# Patient Record
Sex: FEMALE | Race: WHITE | NOT HISPANIC OR LATINO | Employment: OTHER | URBAN - NONMETROPOLITAN AREA
[De-identification: names, ages, dates, MRNs, and addresses within clinical notes are randomized per-mention and may not be internally consistent; named-entity substitution may affect disease eponyms.]

---

## 2018-03-23 ENCOUNTER — OFFICE VISIT CONVERTED (OUTPATIENT)
Dept: FAMILY MEDICINE CLINIC | Age: 43
End: 2018-03-23
Attending: NURSE PRACTITIONER

## 2019-10-04 ENCOUNTER — OFFICE VISIT CONVERTED (OUTPATIENT)
Dept: FAMILY MEDICINE CLINIC | Age: 44
End: 2019-10-04
Attending: FAMILY MEDICINE

## 2019-10-04 ENCOUNTER — HOSPITAL ENCOUNTER (OUTPATIENT)
Dept: OTHER | Facility: HOSPITAL | Age: 44
Discharge: HOME | End: 2019-10-04
Attending: FAMILY MEDICINE

## 2020-01-21 ENCOUNTER — OFFICE VISIT CONVERTED (OUTPATIENT)
Dept: FAMILY MEDICINE CLINIC | Age: 45
End: 2020-01-21
Attending: FAMILY MEDICINE

## 2020-01-31 ENCOUNTER — OFFICE VISIT CONVERTED (OUTPATIENT)
Dept: FAMILY MEDICINE CLINIC | Age: 45
End: 2020-01-31
Attending: FAMILY MEDICINE

## 2020-02-13 ENCOUNTER — HOSPITAL ENCOUNTER (OUTPATIENT)
Dept: PHYSICAL THERAPY | Facility: CLINIC | Age: 45
Setting detail: RECURRING SERIES
Discharge: HOME | End: 2020-06-16
Attending: FAMILY MEDICINE

## 2020-03-02 ENCOUNTER — OFFICE VISIT CONVERTED (OUTPATIENT)
Dept: FAMILY MEDICINE CLINIC | Age: 45
End: 2020-03-02
Attending: FAMILY MEDICINE

## 2020-06-09 ENCOUNTER — HOSPITAL ENCOUNTER (OUTPATIENT)
Dept: OTHER | Facility: HOSPITAL | Age: 45
Discharge: HOME | End: 2020-06-09
Attending: NURSE PRACTITIONER

## 2020-06-09 ENCOUNTER — OFFICE VISIT CONVERTED (OUTPATIENT)
Dept: FAMILY MEDICINE CLINIC | Age: 45
End: 2020-06-09
Attending: NURSE PRACTITIONER

## 2020-06-11 LAB — SARS-COV-2 RNA SPEC QL NAA+PROBE: NOT DETECTED

## 2020-06-18 ENCOUNTER — HOSPITAL ENCOUNTER (OUTPATIENT)
Dept: PHYSICAL THERAPY | Facility: CLINIC | Age: 45
Setting detail: RECURRING SERIES
Discharge: HOME | End: 2020-09-14
Attending: FAMILY MEDICINE

## 2020-08-06 ENCOUNTER — HOSPITAL ENCOUNTER (OUTPATIENT)
Dept: OTHER | Facility: HOSPITAL | Age: 45
Discharge: HOME | End: 2020-08-06
Attending: NURSE PRACTITIONER

## 2020-08-06 ENCOUNTER — OFFICE VISIT CONVERTED (OUTPATIENT)
Dept: FAMILY MEDICINE CLINIC | Age: 45
End: 2020-08-06
Attending: NURSE PRACTITIONER

## 2020-08-14 ENCOUNTER — HOSPITAL ENCOUNTER (OUTPATIENT)
Dept: OTHER | Facility: HOSPITAL | Age: 45
Discharge: HOME | End: 2020-08-14
Attending: NURSE PRACTITIONER

## 2020-08-14 ENCOUNTER — OFFICE VISIT CONVERTED (OUTPATIENT)
Dept: FAMILY MEDICINE CLINIC | Age: 45
End: 2020-08-14
Attending: NURSE PRACTITIONER

## 2020-08-14 LAB
ALBUMIN SERPL-MCNC: 4.1 G/DL (ref 3.5–5)
ALBUMIN/GLOB SERPL: 1.7 {RATIO} (ref 1.4–2.6)
ALP SERPL-CCNC: 48 U/L (ref 42–98)
ALT SERPL-CCNC: 15 U/L (ref 10–40)
ANION GAP SERPL CALC-SCNC: 18 MMOL/L (ref 8–19)
AST SERPL-CCNC: 23 U/L (ref 15–50)
BASOPHILS # BLD MANUAL: 0.05 10*3/UL (ref 0–0.2)
BASOPHILS NFR BLD MANUAL: 0.7 % (ref 0–3)
BILIRUB SERPL-MCNC: 0.19 MG/DL (ref 0.2–1.3)
BUN SERPL-MCNC: 10 MG/DL (ref 5–25)
BUN/CREAT SERPL: 12 {RATIO} (ref 6–20)
CALCIUM SERPL-MCNC: 9.8 MG/DL (ref 8.7–10.4)
CHLORIDE SERPL-SCNC: 101 MMOL/L (ref 99–111)
CHOLEST SERPL-MCNC: 191 MG/DL (ref 107–200)
CHOLEST/HDLC SERPL: 2.8 {RATIO} (ref 3–6)
CONV CO2: 25 MMOL/L (ref 22–32)
CONV RHEUMATOID FACTOR IGM: <10 [IU]/ML (ref 0–14)
CONV TOTAL PROTEIN: 6.5 G/DL (ref 6.3–8.2)
CREAT UR-MCNC: 0.85 MG/DL (ref 0.5–0.9)
CRP SERPL-MCNC: 1.2 MG/L (ref 0–5)
DEPRECATED RDW RBC AUTO: 46.2 FL
EOSINOPHIL # BLD MANUAL: 0.19 10*3/UL (ref 0–0.7)
EOSINOPHIL NFR BLD MANUAL: 2.7 % (ref 0–7)
ERYTHROCYTE [DISTWIDTH] IN BLOOD BY AUTOMATED COUNT: 12.4 % (ref 11.5–14.5)
ERYTHROCYTE [SEDIMENTATION RATE] IN BLOOD: 2 MM/H (ref 0–20)
GFR SERPLBLD BASED ON 1.73 SQ M-ARVRAT: >60 ML/MIN/{1.73_M2}
GLOBULIN UR ELPH-MCNC: 2.4 G/DL (ref 2–3.5)
GLUCOSE SERPL-MCNC: 79 MG/DL (ref 65–99)
GRANS (ABSOLUTE): 3.71 10*3/UL (ref 2–8)
GRANS: 53.4 % (ref 30–85)
HBA1C MFR BLD: 14.2 G/DL (ref 12–16)
HCT VFR BLD AUTO: 42 % (ref 37–47)
HDLC SERPL-MCNC: 68 MG/DL (ref 40–60)
IMM GRANULOCYTES # BLD: 0.02 10*3/UL (ref 0–0.54)
IMM GRANULOCYTES NFR BLD: 0.3 % (ref 0–0.43)
LDLC SERPL CALC-MCNC: 111 MG/DL (ref 70–100)
LYMPHOCYTES # BLD MANUAL: 2.43 10*3/UL (ref 1–5)
LYMPHOCYTES NFR BLD MANUAL: 8 % (ref 3–10)
MCH RBC QN AUTO: 33.7 PG (ref 27–31)
MCHC RBC AUTO-ENTMCNC: 33.8 G/DL (ref 33–37)
MCV RBC AUTO: 99.8 FL (ref 81–99)
MONOCYTES # BLD AUTO: 0.56 10*3/UL (ref 0.2–1.2)
OSMOLALITY SERPL CALC.SUM OF ELEC: 286 MOSM/KG (ref 273–304)
PLATELET # BLD AUTO: 239 10*3/UL (ref 130–400)
PMV BLD AUTO: 8.9 FL (ref 7.4–10.4)
POTASSIUM SERPL-SCNC: 4.8 MMOL/L (ref 3.5–5.3)
RBC # BLD AUTO: 4.21 10*6/UL (ref 4.2–5.4)
SODIUM SERPL-SCNC: 139 MMOL/L (ref 135–147)
TRIGL SERPL-MCNC: 60 MG/DL (ref 40–150)
TSH SERPL-ACNC: 0.82 M[IU]/L (ref 0.27–4.2)
VARIANT LYMPHS NFR BLD MANUAL: 34.9 % (ref 20–45)
VLDLC SERPL-MCNC: 12 MG/DL (ref 5–37)
WBC # BLD AUTO: 6.96 10*3/UL (ref 4.8–10.8)

## 2020-08-15 LAB
DSDNA AB SER-ACNC: NEGATIVE [IU]/ML
ENA AB SER IA-ACNC: NEGATIVE {RATIO}

## 2020-08-20 ENCOUNTER — HOSPITAL ENCOUNTER (OUTPATIENT)
Dept: OTHER | Facility: HOSPITAL | Age: 45
Discharge: HOME | End: 2020-08-20
Attending: NURSE PRACTITIONER

## 2020-08-20 ENCOUNTER — OFFICE VISIT CONVERTED (OUTPATIENT)
Dept: FAMILY MEDICINE CLINIC | Age: 45
End: 2020-08-20
Attending: NURSE PRACTITIONER

## 2020-08-21 ENCOUNTER — OFFICE VISIT CONVERTED (OUTPATIENT)
Dept: NEUROSURGERY | Facility: CLINIC | Age: 45
End: 2020-08-21
Attending: PHYSICIAN ASSISTANT

## 2020-08-26 LAB
CONV LAST MENSTURAL PERIOD: 2014
HPV HYBRID CAPTURE HIGH RISK: NEGATIVE
SPECIMEN SOURCE: NORMAL
SPECIMEN SOURCE: NORMAL
THIN PREP CVX: NORMAL

## 2020-09-11 ENCOUNTER — HOSPITAL ENCOUNTER (OUTPATIENT)
Dept: OTHER | Facility: HOSPITAL | Age: 45
Discharge: HOME | End: 2020-09-11
Attending: PHYSICIAN ASSISTANT

## 2020-09-23 ENCOUNTER — HOSPITAL ENCOUNTER (OUTPATIENT)
Dept: OTHER | Facility: HOSPITAL | Age: 45
Discharge: HOME | End: 2020-09-23
Attending: NURSE PRACTITIONER

## 2020-10-02 ENCOUNTER — OFFICE VISIT CONVERTED (OUTPATIENT)
Dept: NEUROSURGERY | Facility: CLINIC | Age: 45
End: 2020-10-02
Attending: PHYSICIAN ASSISTANT

## 2020-10-02 ENCOUNTER — CONVERSION ENCOUNTER (OUTPATIENT)
Dept: NEUROLOGY | Facility: CLINIC | Age: 45
End: 2020-10-02

## 2020-12-31 ENCOUNTER — OFFICE VISIT CONVERTED (OUTPATIENT)
Dept: FAMILY MEDICINE CLINIC | Age: 45
End: 2020-12-31

## 2021-02-04 ENCOUNTER — CONVERSION ENCOUNTER (OUTPATIENT)
Dept: NEUROLOGY | Facility: CLINIC | Age: 46
End: 2021-02-04

## 2021-02-04 ENCOUNTER — OFFICE VISIT CONVERTED (OUTPATIENT)
Dept: NEUROLOGY | Facility: CLINIC | Age: 46
End: 2021-02-04
Attending: PSYCHIATRY & NEUROLOGY

## 2021-05-10 NOTE — H&P
"   History and Physical      Patient Name: Destiney Thomas   Patient ID: [id number]   Sex: Female   YOB: 1975    Primary Care Provider: Kalie ZARAGOZA   Referring Provider: Cori Wood PA-C    Visit Date: February 4, 2021    Provider: Wei Recinos MD   Location: [facility]   Location Address: [address] [place] [address]  [id number]   Location Phone: [phone]          Chief Complaint     L\">BUE numbness tingling pain and weakness R>L       History Of Present Illness  Destiney Thomas is a 45 year old /White female who presents today to [facility] today referred from Cori Wood PA-C.      45-year-old woman evaluated for right hand greater than left hand numbness.  This has been ongoing for the last 3 years.  She states that the right hand and arm gets numb and tingly about 4 times a day usually associated doing activities such as rolling her cigarettes.  She states she can move her arm or wiggle or shake her hand and usually the numbness and tingling goes away.  She also has neck pain but there are no radicular symptoms of pain going down her arms.  It is numbness and tingling that is bothering her.  She states that the entire hand gets numb and tingly and the tingling goes up to her shoulder.  She is here today for EMG nerve conduction study.    She has had an MRI of the cervical spine which I reviewed the report.    She states that she used the wrist splint for 6 months and it did not help her symptoms.       Past Medical History  Lumbago/low back pain         Past Surgical History  Breast augmentation         Medication List  cyclobenzaprine 5 mg oral tablet; Flomax 0.4 mg oral capsule; Zofran 4 mg oral tablet         Allergy List  NO KNOWN DRUG ALLERGIES       Allergies Reconciled  Family Medical History  Stroke         Social History  Tobacco (Current every day)         Review of Systems  · Constitutional  o Admits  o : " "excessive sweating  o Denies  o : chills, fatigue, fever, sycope/passing out, weight gain, weight loss  · Eyes  o Admits  o : changes in vision, blurry vision  o Denies  o : double vision  · HENT  o Admits  o : ringing in the ears  o Denies  o : loss of hearing, ear aches, sore throat, nasal congestion, sinus pain, nose bleeds, seasonal allergies  · Cardiovascular  o Denies  o : blood clots, swollen legs, anemia, easy burising or bleeding, transfusions  · Respiratory  o Denies  o : shortness of breath, dry cough, productive cough, pneumonia, COPD  · Gastrointestinal  o Denies  o : difficulty swallowing, reflux  · Genitourinary  o Denies  o : incontinence  · Neurologic  o Admits  o : headache, loss of balance, dizziness/vertigo, numbness/tingling/paresthesia   o Denies  o : seizure, stroke, tremor, falls, difficulty with sleep, difficulty with coordination, difficulty with dexterity, weakness  · Musculoskeletal  o Admits  o : neck stiffness/pain, muscle aches, spasms, pain radiating in arm, pain radiating in leg, low back pain  o Denies  o : swollen lymph nodes, joint pain, weakness, sciatica  · Endocrine  o Denies  o : diabetes, thyroid disorder  · Psychiatric  o Denies  o : anxiety, depression      Vitals  Date Time BP Position Site L\R Cuff Size HR RR TEMP (F) WT  HT  BMI kg/m2 BSA m2 O2 Sat FR L/min FiO2 HC       02/04/2021 10:12 /76 Sitting    80 - R 18  165lbs 0oz 5'  5.5\" 27.04 1.86             Physical Examination     She is alert, fluent, phasic, follows commands well.  There is no weakness of the upper extremity on average muscle testing.  There is no weakness of intrinsic hand muscles.  Reflexes are normoactive and symmetrical in the biceps, triceps, brachioradialis.  Phalen sign is negative.  Pressure at the wrist does not produce tingling to her fingers.  Range of motion of the right arm and shoulder is normal.           Assessment  · Carpal tunnel syndrome     354.0/G56.00  This study is abnormal " and shows electrophysiologic evidence for moderate carpal tunnel syndrome on the right and mild carpal tunnel syndrome on the left. EMG studies unremarkable and does not show electrophysiologic evidence for cervical radiculopathy involving the C5-T1 ventral nerve roots on the right side. There is no evidence of denervation or reinnervation in the muscles tested.    She is to follow-up with neurosurgery with the option to do carpal tunnel surgery to the right hand.    Total time spent with patient and coordinating patient care was 25 minutes.      Plan  · Orders  o Muscle test done with Nerve test Complete (33648) - 354.0/G56.00 - 02/04/2021  o Nerve conduction studies; 5-6 studies (75559) - 354.0/G56.00 - 02/04/2021  · Medications  o Medications have been Reconciled  o Transition of Care or Provider Policy  · Instructions  o Encouraged to follow-up with Primary Care Provider for preventative care.            Electronically Signed by: Wei Recinos MD -Author on February 4, 2021 11:16:00 AM

## 2021-05-10 NOTE — H&P
History and Physical      Patient Name: Destiney Thomas   Patient ID: [id number]   Sex: Female   YOB: 1975    Primary Care Provider: Kalie ZARAGOZA   Referring Provider: Kalie ZARAGOZA    Visit Date: August 21, 2020    Provider: Cori Wood PA-C   Location: [facility]   Location Address: [address] [place] [address] Rian Mendoza [place], [place]  [id number]   Location Phone: [phone]          Chief Complaint     Patient is here with complaints of low back pain. Xray at [facility]       History Of Present Illness  The patient is a 45 year old /White female, who presents on referral from Kalie ZARAGOZA, for a neurosurgical evaluation of low back pain.   The pain developed gradually around 23 years but getting worse. Pain into the right leg down to the foot that is intermittent. It is moderate (3-6/10) in severity, has an aching quality and radiates into the right S1 distribution. The pain has been constant. The pain tends to be maximal at no specific time, but waxes and wanes in severity throughout the day. The patient states the pain is aggravated by prolonged sitting, prolonged standing, walking long distances, and staying in one position for extended periods. No alleviating factors are reported.   She denies bowel or bladder dysfunction. The patient has no prior history of neck or back surgery.   RECENT INTERVENTIONS:  She has been previously treated with physical therapy, NSAIDs, and TENS unit and massagers. The physical therapy was minimally effective in relieving the pain.   INFORMATION REVIEWED:  The following information was reviewed: radiology reports and images. Lumbar radiographs revealed ddd most notable at L5/S1.Cervical spine radigraphs showed ddd at C5/6 and C6/7 with osteophytes..      Neck pain for several years and getting worse.  Having hedaches that are intermittent.  Worse with working because of repetitive motions of the arms.  She has been on  different job at work so pain has eased a little.  Paresthesias in the from the elbow down to the middle, ring, and small fingers but sometimes in proximal arms as well.  Symptoms wake her up at night.  Using TENS unit, massagers, and physical therapy with minimal improvement. Cervical traction helps pain.       Past Medical History  Lumbago/low back pain         Past Surgical History  Breast augmentation         Medication List  Flomax 0.4 mg oral capsule         Allergy List  NO KNOWN DRUG ALLERGIES       Allergies Reconciled  Family Medical History  Stroke         Social History  Tobacco (Current every day)         Review of Systems  · Constitutional  o Denies  o : chills, excessive sweating, fatigue, fever, sycope/passing out, weight gain, weight loss  · Eyes  o Admits  o : changes in vision, blurry vision, double vision  · HENT  o Denies  o : loss of hearing, ringing in the ears, ear aches, sore throat, nasal congestion, sinus pain, nose bleeds, seasonal allergies  · Cardiovascular  o Admits  o : swollen legs, easy burising or bleeding  o Denies  o : blood clots, anemia, transfusions  · Respiratory  o Denies  o : shortness of breath, dry cough, productive cough, pneumonia, COPD  · Gastrointestinal  o Denies  o : difficulty swallowing, reflux  · Genitourinary  o Denies  o : incontinence  · Neurologic  o Admits  o : headache, difficulty with coordination, weakness  o Denies  o : seizure, stroke, tremor, loss of balance, falls, dizziness/vertigo, difficulty with sleep, numbness/tingling/paresthesia , difficulty with dexterity  · Musculoskeletal  o Admits  o : neck stiffness/pain, muscle aches, joint pain, sciatica, pain radiating in arm, pain radiating in leg, low back pain  o Denies  o : swollen lymph nodes, weakness, spasms  · Endocrine  o Denies  o : diabetes, thyroid disorder  · Psychiatric  o Denies  o : anxiety, depression  · All Others Negative      Vitals  Date Time BP Position Site L\R Cuff Size HR RR  "TEMP (F) WT  HT  BMI kg/m2 BSA m2 O2 Sat        08/21/2020 11:30 AM        97.8 165lbs 0oz 5'  6\" 26.63 1.87           Physical Examination  · Constitutional  o Appearance  o : well-nourished, well developed, alert, in no acute distress  · Respiratory  o Respiratory Effort  o : breathing unlabored  · Cardiovascular  o Peripheral Vascular System  o :   § Extremities  § : no edema or cyanosis  · Musculoskeletal  o Spine  o :   § Range of Motion  § : range of motion normal  o Right Lower Extremity  o :   § Inspection/Palpation  § : no joint or limb tenderness to palpation, no edema present, no ecchymosis  § Joint Stability  § : joint stability within normal limits  § Range of Motion  § : range of motion normal, no joint crepitations present, no pain on motion, Jaylen's test negative  o Left Lower Extremity  o :   § Inspection/Palpation  § : no joint or limb tenderness to palpation, no edema present, no ecchymosis  § Joint Stability  § : joint stability within normal limits  § Range of Motion  § : range of motion normal, no joint crepitations present, no pain on motion, Jaylen's test negative  · Skin and Subcutaneous Tissue  o Extremities  o :   § Right Lower Extremity  § : no lesions or areas of discoloration  § Left Lower Extremity  § : no lesions or areas of discoloration  o Back  o : no lesions or areas of discoloration  · Neurologic  o Mental Status Examination  o :   § Orientation  § : alert and oriented to time, person, place and events  o Cranial Nerves  o :   § Oculomotor, Trochlear and Abducens Nerves  § : EOM intact  § Facial Nerve  § : Face symmetric  o Motor Examination  o :   § RUE Strength  § : strength normal  § RUE Motor Function  § : tone normal  § LUE Strength  § : strength normal  § LUE Motor Function  § : tone normal  § RLE Strength  § : strength normal  § RLE Motor Function  § : tone normal, no atrophy, no abnormal movements noted  § LLE Strength  § : strength normal  § LLE Motor Function  § : " tone normal, no atrophy, no abnormal movements noted  o Reflexes  o :   § RUE  § : biceps reflex 3, triceps reflex 3, brachioradialis reflex 3   § LUE  § : biceps reflex 3, triceps reflex 3, brachioradialis reflex 3   § RLE  § : knee and ankle reflexes 0/4, SLR negative  § LLE  § : knee and ankle reflexes 3/4, SLR negative  o Sensation  o :   § Light Touch  § : sensation intact to light touch in extremities  § Pin Prick  § : sensation intact to pin prick in extremities  o Gait and Station  o :   § Gait Screening  § : normal gait, able to stand without difficulty  · Psychiatric  o Mood and Affect  o : mood normal, affect appropriate     positive Tinel's at the wrists and negative at the elbows           Assessment  · Degeneration of lumbar intervertebral disc     722.52/M51.36  · Lumbago/low back pain     724.3/M54.40  · Paresthesia     782.0/R20.2  · Radiculopathy, lumbosacral     724.4/M54.16  · Cervicalgia     723.1/M54.2  · Cervical disc disorder     722.91/M50.90    Problems Reconciled  Plan  · Orders  o MRI of lumbar spine without contrast (76831) - 722.52/M51.36, 724.3/M54.40, 724.4/M54.16 - 08/21/2020   [facility]  o MRI cervical spine w/o contrast (48406) - 723.1/M54.2, 722.91/M50.90, 782.0/R20.2 - 08/21/2020   [facility]   · Medications  o Medications have been Reconciled  o Transition of Care or Provider Policy  · Instructions  o Encouraged to follow-up with Primary Care Provider for preventative care.  o The ROS and the PFSH were reviewed at today's visit.  o Call or return to office if symptoms worsen or persist.  o She has ddd at L5/S1 and C5/6 and C6/7 on plain radiographs. Failed PT and NSAIDS. I will order MRIs and f/u to discuss imaging.             Electronically Signed by: Cori Wood PA-C -Author on August 21, 2020 12:00:50 PM

## 2021-05-13 NOTE — PROGRESS NOTES
Progress Note      Patient Name: Destiney Thomas   Patient ID: [id number]   Sex: Female   YOB: 1975    Primary Care Provider: Kalie ZARAGOZA   Referring Provider: Kalie ZARAGOZA    Visit Date: October 2, 2020    Provider: Cori Wood PA-C   [facility]   Location Address: [address] [place] [address] [place], [place]  [id number]   Location Phone: [phone]          Chief Complaint  · Follow-up      History Of Present Illness  The patient is a 45 year old /White female who is in the office for followup appointment.      Here for f/u to discuss MRI results.  MRI cervical spine showed moderate central canal stenosis at C5/6 and C6/7.  MRI lumbar spine showed ddd at several levels in the lower lumbar spine.  She continues to have paresthesias in the arms/hands and symptoms wake her up at night.  Has not tried wrist splints at this point.       Past Medical History  Lumbago/low back pain         Past Surgical History  Breast augmentation         Medication List  cyclobenzaprine 5 mg oral tablet; Flomax 0.4 mg oral capsule; Zofran 4 mg oral tablet         Allergy List  NO KNOWN DRUG ALLERGIES       Allergies Reconciled  Family Medical History  Stroke         Social History  Tobacco (Current every day)         Review of Systems  · Constitutional  o Admits  o : excessive sweating, fatigue  o Denies  o : chills, fever, sycope/passing out, weight gain, weight loss  · Eyes  o Admits  o : changes in vision, blurry vision, double vision  · HENT  o Admits  o : ringing in the ears, nasal congestion, sinus pain, seasonal allergies  o Denies  o : loss of hearing, ear aches, sore throat, nose bleeds  · Cardiovascular  o Admits  o : easy burising or bleeding  o Denies  o : blood clots, swollen legs, anemia, transfusions  · Respiratory  o Admits  o : productive cough  o Denies  o : shortness of breath, dry cough, pneumonia,  "COPD  · Gastrointestinal  o Denies  o : difficulty swallowing, reflux  · Genitourinary  o Denies  o : incontinence  · Neurologic  o Admits  o : headache, loss of balance, falls, dizziness/vertigo, difficulty with sleep, numbness/tingling/paresthesia , difficulty with coordination, weakness  o Denies  o : seizure, stroke, tremor, difficulty with dexterity  · Musculoskeletal  o Admits  o : neck stiffness/pain, swollen lymph nodes, muscle aches, weakness, spasms, pain radiating in arm, pain radiating in leg, low back pain  o Denies  o : joint pain, sciatica  · Endocrine  o Denies  o : diabetes, thyroid disorder  · Psychiatric  o Denies  o : anxiety, depression  · All Others Negative      Vitals  Date Time BP Position Site L\R Cuff Size HR RR TEMP (F) WT  HT  BMI kg/m2 BSA m2 O2 Sat FR L/min FiO2        10/02/2020 10:00 AM        98.2 167lbs 2oz 5'  6\" 26.97 1.88             Physical Examination  · Constitutional  o Appearance  o : well-nourished, well developed, alert, in no acute distress  · Respiratory  o Respiratory Effort  o : breathing unlabored  · Cardiovascular  o Peripheral Vascular System  o :   § Extremities  § : no cyanosis, clubbing or edema  · Neurologic  o Mental Status Examination  o :   § Orientation  § : grossly oriented to person, place and time  o Motor Examination  o :   § RUE Strength  § : strength normal  § RUE Motor Function  § : tone normal  § LUE Strength  § : strength normal  § LUE Motor Function  § : tone normal  § RLE Strength  § : strength normal  § RLE Motor Function  § : tone normal, no atrophy, no abnormal movements noted  § LLE Strength  § : strength normal  § LLE Motor Function  § : tone normal, no atrophy, no abnormal movements noted  o Reflexes  o :   § RUE  § : biceps reflex 2, triceps reflex 2, brachioradialis reflex 2  § LUE  § : biceps reflex 2, triceps reflex 2, brachioradialis reflex 2  § RLE  § : 2/4 knee and ankle reflex  § LLE  § : 2/4 knee and ankle " reflex  o Sensation  o :   § Light Touch  § : sensation intact to light touch in extremities  o Gait and Station  o :   § Gait Screening  § : gait within normal limits  · Psychiatric  o Mood and Affect  o : mood normal, affect appropriate     positive Phalen's bilaterally, positive Tinel's at the left elbow           Assessment  · Degeneration of lumbar intervertebral disc     722.52/M51.36  · Lumbago/low back pain     724.3/M54.40  · Paresthesia     782.0/R20.2  · Radiculopathy, lumbosacral     724.4/M54.16  · Cervicalgia     723.1/M54.2  · Cervical disc disorder     722.91/M50.90  · Cervical stenosis of spinal canal     723.0/M48.02    Problems Reconciled  Plan  · Orders  o EMG/NCV of Upper Extremities Bilaterally (15730) - 723.1/M54.2, 722.91/M50.90, 782.0/R20.2 - 10/02/2020   concern for carpal tunnel and ulnar neuropathy  · Medications  o Medications have been Reconciled  o Transition of Care or Provider Policy  · Instructions  o The ROS and the PFSH were reviewed at today's visit.  o Call or return to office if symptoms worsen or persist.   o I will order an EMG/NCV of the upper extremities to evaluate for carpal tunnel and ulnar neuropathy. She will obtain wrist splints. F/U in [place] office to discuss results. Moderate central canal stenosis from central disc protrusions at C5/6 and C6/7 and multilevel ddd in the lumbar region. Surgery not recommend for the cervical or lumbar spine at this point.             Electronically Signed by: Cori Wood PA-C -Author on October 2, 2020 10:30:05 AM

## 2021-05-14 VITALS
SYSTOLIC BLOOD PRESSURE: 121 MMHG | WEIGHT: 165 LBS | HEIGHT: 65 IN | BODY MASS INDEX: 27.49 KG/M2 | DIASTOLIC BLOOD PRESSURE: 76 MMHG | HEART RATE: 80 BPM | RESPIRATION RATE: 18 BRPM

## 2021-05-14 VITALS — TEMPERATURE: 98.2 F | HEIGHT: 66 IN | BODY MASS INDEX: 26.86 KG/M2 | WEIGHT: 167.12 LBS

## 2021-05-15 VITALS — WEIGHT: 165 LBS | TEMPERATURE: 97.8 F | HEIGHT: 66 IN | BODY MASS INDEX: 26.52 KG/M2

## 2021-05-18 NOTE — PROGRESS NOTES
Destiney Thomas  1975     Office/Outpatient Visit    Visit Date: Thu, Dec 31, 2020 01:28 pm    Provider: Riya Kelsey N.P. (Assistant: Monique Wood, )    Location: [facility]        Electronically signed by Riya Kelsey N.P. on  12/31/2020 01:54:36 PM                             Subjective:        CC: Ms. Thomas is a 45 year old White female.  discuss flexeril, sinus issues due to not having nasal spray;         HPI: Patient reports sinus congestion due to not being able to  her flonase. Patient is taking flexeril for her neck and back pain. Patient is seeing neurologist and nerve specialist due to nerve and muscle spasms. Follow up with neuro in Feb, sees the nerve specialist in early Feb. Patient reports 3 migraines this month. Flexeril was last taken beginning of December and is out of refills. Denies N, V, D, fever, loss of taste or smell.     ROS:     CONSTITUTIONAL:  Negative for chills, fatigue and fever.      EYES:  Negative for blurred vision.      E/N/T:  Negative for ear pain, nasal congestion, frequent rhinorrhea, hoarseness, sinus pressure and sore throat.      CARDIOVASCULAR:  Negative for chest pain and pedal edema.      RESPIRATORY:  Negative for recent cough and dyspnea.      GASTROINTESTINAL:  Negative for abdominal pain, constipation, diarrhea, nausea and vomiting.      GENITOURINARY:  Negative for dysuria and frequent urination.      MUSCULOSKELETAL:  Negative for myalgias.      NEUROLOGICAL:  Negative for headaches.      PSYCHIATRIC:  Negative for anxiety, depression, and sleep disturbances.          Past Medical History / Family History / Social History:         Last Reviewed on 12/31/2020 01:42 PM by Riya Kelsey    Past Medical History:                 PAST MEDICAL HISTORY         Asthma: moderate severity; had inhaler , and treatment began  13 years ago, last episode in 2009;     Urinary Tract Infections, Recurrent: has had e coli infections;      Seizure Disorder: did test for seizures , negative, and episode was related to low blood sugar;  syncope dx'd in  did have a headace that lasted at least 24 hours         GYNECOLOGICAL HISTORY:        Problems with menstrual cycles include heavy bleeding.      Contraception: S/P tubal ligation;    Menarche occurred at age 10.    No abnormal Paps    Has never had a mammogram.  Hospitalizations: Never             ADVANCED DIRECTIVES: None         PREVENTIVE HEALTH MAINTENANCE             PAP SMEAR: was last done 14 with normal results         Surgical History:         Positive for    Breast Augmentation - below the muscle; at age [age].  ;     Positive for    Bilateral Tubal Ligation and    uterine ablation;;         Family History:     Father: Hypertension; Hyperlipidemia; Myocardial Infarction;  Transient Ischemic Attack; blocked carotids;  Anxiety     Mother: Hypertension; Hyperlipidemia;  lupus;  Anxiety; Depression; Bipolar Disorder     Brother(s): Healthy; 2 brother(s) total     Paternal Grandfather: Medical history unknown     Paternal Grandmother: Medical history unknown     Maternal Grandfather: Medical history unknown     Maternal Grandmother: Alzheimer's Disease         Social History:     Occupation: [employer]      Marital Status:      Children: 2 children         Tobacco/Alcohol/Supplements:     Last Reviewed on 2020 01:42 PM by Riya Kelsey    Tobacco: Current Smoker: She currently smokes every day, 1-1/2 packs per day.          Alcohol: Frequency: Socially         Substance Abuse History:     Last Reviewed on 2020 01:42 PM by Riya Kelsey        Marijuana: Current use.          Mental Health History:     Last Reviewed on 2020 01:42 PM by Riya Kelsey        Generalized Anxiety Disorder         Communicable Diseases (eg STDs):     Last Reviewed on 3/02/2020 06:52 PM by Raoul Galeas        Immunizations:     None        Allergies:      Last Reviewed on 12/31/2020 01:42 PM by Riya Kelsey    No Known Allergies.        Current Medications:     Last Reviewed on 12/31/2020 01:42 PM by Riya Kelsey    cyclobenzaprine 10 mg oral tablet [TAKE 1 TABLET BY MOUTH EVERY 8 HOURS AS NEEDED FOR MUSCLE SPASM]    ondansetron HCL 4 mg oral tablet [TAKE 1 TABLET BY MOUTH EVERY 4-6 HOURS AS NEEDED FOR NAUSEA AND VOMITING]    fluticasone propionate 50 mcg/actuation Intranasal Spray, Suspension [inhale 1 spray (50 mcg) in each nostril by intranasal route daily]    efinaconazole 10 % Topical Solution With Applicator [apply to affected toenail(s) by topical route once daily x 48 weeks]        Objective:        Vitals:         Current: 12/31/2020 1:30:59 PM    Ht:  5 ft, 6 in;  Wt: 161.2 lbs;  BMI: 26.0T: 97.7 F (temporal);  BP: 124/73 mm Hg (right arm, sitting);  P: 77 bpm (right arm (BP Cuff), sitting);  sCr: 0.85 mg/dL;  GFR: 85.33        Exams:     PHYSICAL EXAM:     GENERAL:  well developed and nourished; appropriately groomed; in no apparent distress;     EYES: PERRL, EOMI     E/N/T: NOSE: nasal mucosa is erythematous;     NECK: range of motion is decreased with flexion, extension, side flexion, and due to pain;     RESPIRATORY: normal respiratory rate and pattern with no distress; normal breath sounds with no rales, rhonchi, wheezes or rubs;     CARDIOVASCULAR: normal rate; rhythm is regular;  no systolic murmur; no edema;     GASTROINTESTINAL: nontender; normal bowel sounds; no organomegaly;     LYMPHATIC: no enlargement of cervical or facial nodes; no supraclavicular nodes;     BREAST/INTEGUMENT: no rashs or lesions noted;     MUSCULOSKELETAL:  gait normal;     NEUROLOGIC: mental status: alert and oriented x 3; GROSSLY INTACT     PSYCHIATRIC:  appropriate affect and demeanor; normal speech pattern; grossly normal memory;         Assessment:         G43.909   Migraine, unspecified, not intractable, without status migrainosus       M54.2   Cervicalgia        M50.90   Cervical disc disorder, unspecified, unspecified cervical region       J30.2   Other seasonal allergic rhinitis           ORDERS:         Meds Prescribed:       [Refilled] cyclobenzaprine 10 mg oral tablet [TAKE 1 TABLET BY MOUTH EVERY 8 HOURS AS NEEDED FOR MUSCLE SPASM], #30 (thirty) tablets, Refills: 1 (one)       [Refilled] fluticasone propionate 50 mcg/actuation Intranasal Spray, Suspension [inhale 1 spray (50 mcg) in each nostril by intranasal route daily], #16 (sixteen) milliliters, Refills: 5 (five)                 Plan:         Migraine, unspecified, not intractable, without status migrainosusPatient has follow up with neuro in Feb.         CervicalgiaRefill placed on flexeril, patient sees nerve specialist and neuro in feb.           Prescriptions:       [Refilled] cyclobenzaprine 10 mg oral tablet [TAKE 1 TABLET BY MOUTH EVERY 8 HOURS AS NEEDED FOR MUSCLE SPASM], #30 (thirty) tablets, Refills: 1 (one)         Other seasonal allergic rhinitisrefill placed on flonase, patient to  today.           Prescriptions:       [Refilled] fluticasone propionate 50 mcg/actuation Intranasal Spray, Suspension [inhale 1 spray (50 mcg) in each nostril by intranasal route daily], #16 (sixteen) milliliters, Refills: 5 (five)             Charge Capture:         Primary Diagnosis:     G43.909  Migraine, unspecified, not intractable, without status migrainosus           Orders:      [id number]  Office/outpatient visit; established patient, level 3  (In-House)              M54.2  Cervicalgia     M50.90  Cervical disc disorder, unspecified, unspecified cervical region     J30.2  Other seasonal allergic rhinitis         ADDENDUMS:      ____________________________________    Addendum: 01/05/2021 12:57 PM - Riya Kelsey        Remove [id number]    Add [id number]

## 2021-05-18 NOTE — PROGRESS NOTES
Destiney Thomas  1975     Office/Outpatient Visit    Visit Date: Thu, Aug 20, 2020 09:45 am    Provider: Kalie Choudhary N.P. (Assistant: Cleopatra Gutierrez MA)    Location: [facility]        Electronically signed by Kalie Choudhary N.P. on  08/20/2020 10:37:39 AM                             Subjective:        CC: Ms. Thomas is a 45 year old White female.  Patient presents today for well woman exam (Not taking Claritin);         HPI:           Dx with encounter for general adult medical examination without abnormal findings; she cannot recall when she last had a physical exam.  Her last menstrual period was 2014.  She is not currently using any form of contraception.  She performs breast self-exams rarely.   Her last Pap smear was in 04/08/2016 and was normal.   Preventative Health updated today.  Ms. Thomas denies any history of abnormal Pap smears.  Tobacco: Current Smoker: She currently smokes every day, 1-1/2 packs per day.      ROS:     CONSTITUTIONAL:  Negative for fatigue, fever and night sweats.      CARDIOVASCULAR:  Negative for chest pain, palpitations and pedal edema.      RESPIRATORY:  Negative for recent cough and dyspnea.      GASTROINTESTINAL:  Negative for abdominal pain, constipation, diarrhea, nausea and vomiting.      GENITOURINARY:  Negative for dysuria, post-coital vaginal bleeding, urinary incontinence, vaginal discharge and vaginal itching.      MUSCULOSKELETAL:  Negative for myalgias.      INTEGUMENTARY/BREAST:  Negative for atypical mole(s) and rash.      NEUROLOGICAL:  Negative for dizziness, paresthesias and weakness.      PSYCHIATRIC:  Negative for anxiety, depression, sleep disturbance and suicidal thoughts.          Past Medical History / Family History / Social History:         Last Reviewed on 8/20/2020 10:08 AM by Kalie Choudhary    Past Medical History:                 PAST MEDICAL HISTORY         Asthma: moderate severity; had inhaler , and treatment  began  13 years ago, last episode in ;     Urinary Tract Infections, Recurrent: has had e coli infections;     Seizure Disorder: did test for seizures , negative, and episode was related to low blood sugar;  syncope dx'd in  did have a headace that lasted at least 24 hours         GYNECOLOGICAL HISTORY:        Problems with menstrual cycles include heavy bleeding.      Contraception: S/P tubal ligation;    Menarche occurred at age 10.    No abnormal Paps    Has never had a mammogram.  Hospitalizations: Never             ADVANCED DIRECTIVES: None         PREVENTIVE HEALTH MAINTENANCE             PAP SMEAR: was last done 14 with normal results         Surgical History:         Positive for    Breast Augmentation - below the muscle; at age [age].  ;     Positive for    Bilateral Tubal Ligation and    uterine ablation;;         Family History:     Father: Hypertension; Hyperlipidemia; Myocardial Infarction;  Transient Ischemic Attack; blocked carotids;  Anxiety     Mother: Hypertension; Hyperlipidemia;  lupus;  Anxiety; Depression; Bipolar Disorder     Brother(s): Healthy; 2 brother(s) total     Paternal Grandfather: Medical history unknown     Paternal Grandmother: Medical history unknown     Maternal Grandfather: Medical history unknown     Maternal Grandmother: Alzheimer's Disease         Social History:     Occupation: [employer]      Marital Status:      Children: 2 children         Tobacco/Alcohol/Supplements:     Last Reviewed on 2020 10:08 AM by Kalie Choudhary    Tobacco: Current Smoker: She currently smokes every day, 1-1/2 packs per day.          Alcohol: Frequency: Socially         Substance Abuse History:     Last Reviewed on 2020 10:08 AM by Kalie Choudhary        Marijuana: Current use.          Mental Health History:     Last Reviewed on 2020 10:08 AM by Kalie Choudhary        Generalized Anxiety Disorder         Communicable Diseases (eg  STDs):     Last Reviewed on 3/02/2020 06:52 PM by Raoul Galeas        Immunizations:     None        Allergies:     Last Reviewed on 8/20/2020 10:08 AM by Kalie Choudhary    No Known Allergies.        Current Medications:     Last Reviewed on 8/20/2020 10:08 AM by Kalie Choudhary    Claritin Allergy 24 hr     cyclobenzaprine 10 mg oral tablet [take 1 tablet by mouth every 8 hours as needed for muscle spasm]    Zofran 4 mg oral tablet [take 1 tablet q 4-6 hrs prn nausea or vomiting]    fluticasone propionate 50 mcg/actuation Intranasal Spray, Suspension [inhale 1 spray (50 mcg) in each nostril by intranasal route daily]    efinaconazole 10 % Topical Solution With Applicator [apply to affected toenail(s) by topical route once daily x 48 weeks]        Objective:        Vitals:         Current: 8/20/2020 9:49:27 AM    Ht:  5 ft, 6 in;  Wt: 162.4 lbs;  BMI: 26.2T: 97.6 F (temporal);  BP: 112/64 mm Hg (right arm, sitting);  P: 86 bpm (right arm (BP Cuff), sitting);  sCr: 0.85 mg/dL;  GFR: 85.60        Exams:     PHYSICAL EXAM:     GENERAL: vital signs recorded - well developed, well nourished;  no apparent distress;     EYES: extraocular movements intact; conjunctiva and cornea are normal; PERRLA;     RESPIRATORY: normal respiratory rate and pattern with no distress; normal breath sounds with no rales, rhonchi, wheezes or rubs;     CARDIOVASCULAR: normal rate; rhythm is regular;  no systolic murmur; no edema;     GASTROINTESTINAL: nontender; normal bowel sounds; no organomegaly;     GENITOURINARY:  normal appearance of external genitalia, urethra, and cervix; no cervical motion tenderness; no adnexal tenderness or masses on bimanual exam;     LYMPHATIC: no enlargement of cervical or facial nodes; no axillary adenopathy;     BREAST/INTEGUMENT: BREASTS: breast exam is normal without masses, skin changes, or nipple discharge; SKIN: no significant rashes or lesions; no suspicious moles; implants    MUSCULOSKELETAL:   Normal range of motion, strength and tone;     NEUROLOGIC: mental status: alert and oriented x 3;     PSYCHIATRIC:  appropriate affect and demeanor; normal speech pattern; grossly normal memory;         Assessment:         Z00.00   Encounter for general adult medical examination without abnormal findings       V72.31   Well Woman Exam           ORDERS:         Lab Orders:       [id number]  Cytopathology, slides, cervical or vaginal; manual screening under MD supervision  (Send-Out)              Other Orders:         Depression screen negative  (In-House)                      Plan:         Encounter for general adult medical examination without abnormal findingswill notify pt of results. Reviewed labs ordered from last visit. Pt to f/u in 1 year or sooner if needed. Pt v/u and had no further questions upon d/c.         Well Woman Examwill notify pt of results. Reviewed labs ordered from last visit. Pt to f/u in 1 year or sooner if needed. Pt v/u and had no further questions upon d/c.     MIPS PHQ-9 Depression Screening: Completed form scanned and in chart; Total Score 4; Negative Depression Screen           Orders:       [id number]  Cytopathology, slides, cervical or vaginal; manual screening under MD supervision  (Send-Out)              Depression screen negative  (In-House)                  Charge Capture:         Primary Diagnosis:     Z00.00  Encounter for general adult medical examination without abnormal findings           Orders:      [id number]  Preventive medicine, established patient, age 40-64 years  (In-House)              V72.31  Well Woman Exam           Orders:        Depression screen negative  (In-House)                  ADDENDUMS:      ____________________________________    Addendum: 08/20/2020 11:10 AM - Gloria Kim        Please add [phone] handling fee.        Addendum: 08/31/2020 12:35 PM - Kalie Choudhary        Primary Diagnosis:     Remove: Z00.00  Encounter for general adult medical  examination without     abnormal findings     Add: Z01.419 Encounter for gynecological exam (general) (routine) without     abnormal findings

## 2021-05-18 NOTE — PROGRESS NOTES
Destiney Thomas  1975     Office/Outpatient Visit    Visit Date: Fri, Jan 31, 2020 02:28 pm    Provider: Praneeth Aguilera MD (Assistant: Catherine Landon MA)    Location: [facility]        Electronically signed by Praneeth Aguilera MD on  02/06/2020 02:17:30 PM                             Subjective:        CC: Ms. Thomas is a 44 year old White female.  She presents with sinus pressure, neck pain, migranes. Not improved after ABX.          HPI:       Patient was last seen in our office approximately 10 days ago with complaints of chest congestion, chills, cough, dizziness, headache, nasal congestion, rhinorrhea and vomiting.  She was diagnosed with acute sinusitis and started on Augmentin for 7-day course.  She says she is completed her course of Augmentin and that she still has residual cough and congestion.  She notes that her symptoms are improved from prior but obviously have not went all the way away.      She also complains of neck tightness and pain today.  She says her headaches that she complained about at last visit improved with a headache cocktail but have intermittently returned since then.  She says she has had issues with her neck in the past that were helped by physical therapy and is wondering if she can have a referral for this. No weakness or numbness/tingling.  No blurry vision.    ROS:     CONSTITUTIONAL:  Negative for chills, fatigue and fever.      E/N/T:  Positive for nasal congestion and frequent rhinorrhea.   Negative for ear pain, tinnitus, hoarseness, sinus pressure or sore throat.      CARDIOVASCULAR:  Negative for chest pain, dizziness, palpitations and edema.      RESPIRATORY:  Positive for cough and chest congestion.   Negative for dyspnea.      GASTROINTESTINAL:  Negative for abdominal pain, diarrhea, nausea and vomiting.      MUSCULOSKELETAL:  Positive for neck pain.      INTEGUMENTARY/BREAST:  Negative for rash, breast mass and skin changes of breast.       NEUROLOGICAL:  Positive for headaches.   Negative for weakness.          Past Medical History / Family History / Social History:         Last Reviewed on 2020 02:17 PM by Praneeth Aguilera    Past Medical History:         Asthma: moderate severity; had inhaler , and treatment began  13 years ago, last episode in ;     Urinary Tract Infections, Recurrent: has had e coli infections;     Seizure Disorder: did test for seizures , negative, and episode was related to low blood sugar;  syncope dx'd in  did have a headace that lasted at least 24 hours         GYNECOLOGICAL HISTORY:        Problems with menstrual cycles include heavy bleeding.      Contraception: S/P tubal ligation;    Menarche occurred at age 10.    No abnormal Paps    Has never had a mammogram.  Hospitalizations: Never             ADVANCED DIRECTIVES: None         PREVENTIVE HEALTH MAINTENANCE             PAP SMEAR: was last done 14 with normal results         Surgical History:         Positive for    Breast Augmentation - below the muscle; at age [age].  ;     Positive for    Bilateral Tubal Ligation;         Family History:     Father: Hypertension;  Transient Ischemic Attack; blocked carotids     Mother: Hypertension; Hyperlipidemia     Brother(s): Healthy; 2 brother(s) total         Social History:     Occupation: [employer]      Marital Status:      Children: 2 children         Tobacco/Alcohol/Supplements:     Last Reviewed on 2020 02:17 PM by Praneeth Aguilera    Tobacco: Current Smoker: She currently smokes every day, 1-1/2 packs per day.          Alcohol: Frequency: Socially         Substance Abuse History:     Last Reviewed on 2020 02:17 PM by Praneeth Aguilera    NEGATIVE         Mental Health History:     Last Reviewed on 2020 02:17 PM by Praneeth Aguilera        Communicable Diseases (eg STDs):     Last Reviewed on 2020 02:17 PM by Praneeth Aguilera        Current Problems:     Last Reviewed on  "2/06/2020 02:17 PM by Praneeth Aguilera    Rosal[place]bert    Cigarette smoking    Gross hematuria     Acne rosacea    Rosacea, unspecified    Urinary frequency    Low back pain    Low back pain    High risk heterosexual behavior    High risk sexual behavior    Encounter for screening for depression    Cervicalgia    Migraine, unspecified, not intractable, without status migrainosus    Acute sinusitis, unspecified        Immunizations:     None        Allergies:     Last Reviewed on 2/06/2020 02:17 PM by Praneeth Aguilera    No Known Allergies.        Current Medications:     Last Reviewed on 2/06/2020 02:17 PM by Praneeth Aguilear    Claritin Allergy 24 hr         Objective:        Vitals:         Current: 1/31/2020 2:34:18 PM    Ht:  5 ft, 6 in;  Wt: 164 lbs;  BMI: 26.5T: 97.6 F (oral);  BP: 107/53 mm Hg (left arm, sitting);  P: 73 bpm (left arm (BP Cuff), sitting)        Exams:     PHYSICAL EXAM:     GENERAL: vital signs recorded - well developed, well nourished;  no apparent distress;     EYES: conjunctiva and cornea are normal;     E/N/T: EARS: both TMs are have fluid behind them;  NOSE: nasal mucosa is erythematous;  no sinus tenderness; OROPHARYNX: posterior pharynx shows no exudate and erythema;     NECK: trachea is midline; thyroid is non-palpable;     RESPIRATORY: Clear to auscultation bilaterally; no rales (\"crackles\") present; no rhonchi; no wheezes;     CARDIOVASCULAR: normal rate; rhythm is regular;  No murmurs. clicks, gallops or rubs appreciated; no edema;     LYMPHATIC: no enlargement of cervical or facial nodes; no supraclavicular nodes;     BREAST/INTEGUMENT: No significant rashes, lesions or suspicious moles within limits of examination;     MUSCULOSKELETAL: Tenderness to palpation of cervical and periscapular muscles with associated muscle spasm noted;     NEUROLOGIC: Grossly intact; mental status: alert and oriented x 3;     PSYCHIATRIC: appropriate affect and demeanor; normal speech pattern; Normal behavior;     "     Assessment:         J01.90   Acute sinusitis, unspecified       M54.2   Cervicalgia           ORDERS:         Meds Prescribed:       [New Rx] cyclobenzaprine 5 mg oral tablet [take 1 tablet (5 mg) by oral route 3 times per day as needed], #6 (six) tablets, Refills: 0 (zero)       [New Rx] brompheniramine-pseudoeph-DM 2-30-10 mg/5 mL oral Syrup [take 10 milliliters by oral route every 4 hours], #118 (one hundred and eighteen) milliliters, Refills: 0 (zero)         Procedures Ordered:       REFER  Referral to Specialist or Other Facility  (Send-Out)                      Plan:         Acute sinusitis, unspecified- Will start Bromfed-DM for cough suppression/decongestion.  Continue Tylenol and/or Motrin as needed for fever/discomfort.  If symptoms persist, will consider further work-up and/or referral.  ED/return precautions given.          Prescriptions:       [New Rx] brompheniramine-pseudoeph-DM 2-30-10 mg/5 mL oral Syrup [take 10 milliliters by oral route every 4 hours], #118 (one hundred and eighteen) milliliters, Refills: 0 (zero)         Cervicalgia- Cervical strain versus cervical arthritis.  Will start Flexeril 5 mg 3 times daily as needed for muscle spasm.  We will also refer to physical therapy for further evaluation and management.  If symptoms persist, will consider imaging and/or referral.        REFERRALS:  Referral initiated to physical therapy ( [facility] ) for evaluation and treatment.            Prescriptions:       [New Rx] cyclobenzaprine 5 mg oral tablet [take 1 tablet (5 mg) by oral route 3 times per day as needed], #6 (six) tablets, Refills: 0 (zero)           Orders:       REFER  Referral to Specialist or Other Facility  (Send-Out)                  Charge Capture:         Primary Diagnosis:     J01.90  Acute sinusitis, unspecified           Orders:      [id number]  Office/outpatient visit; established patient, level 4  (In-House)              M54.2  Cervicalgia

## 2021-05-18 NOTE — PROGRESS NOTES
Destiney Thomas  1975     Office/Outpatient Visit    Visit Date: Mon, Mar 2, 2020 03:07 pm    Provider: Raoul Galeas MD (Assistant: Brooklyn Paul MA)    Location: [facility]        Electronically signed by Raoul Galeas MD on  2020 06:56:14 PM                             Subjective:        CC: Ms. Thomas is a 44 year old White female.  head pressure, drainage, congestion, cough;         HPI:       Sinus congestion, hot and cold at work yesterday for 3 hours. Pressure in forehead and pain in her ears. Vomiting x 2 and then she went to bed. Today felt weak and fatigue and sinus congestion. She is coughing up phlegm. She gets sinus congestion this time of year.     ROS:     CONSTITUTIONAL:  Positive for chills, fatigue and fever.      EYES:  Negative for blurred vision.      E/N/T:  Positive for sinus pressure.   Negative for diminished hearing or nasal congestion.      CARDIOVASCULAR:  Negative for chest pain and palpitations.      RESPIRATORY:  Positive for recent cough ( with scant amounts of purulent sputum ).   Negative for dyspnea.      GASTROINTESTINAL:  Positive for nausea and vomiting.   Negative for abdominal pain, constipation or diarrhea.      MUSCULOSKELETAL:  Negative for arthralgias and myalgias.      NEUROLOGICAL:  Negative for paresthesias and weakness.      PSYCHIATRIC:  Negative for anxiety, depression and sleep disturbance.          Past Medical History / Family History / Social History:         Last Reviewed on 3/02/2020 06:52 PM by Raoul Galeas    Past Medical History:         Asthma: moderate severity; had inhaler , and treatment began  13 years ago, last episode in ;     Urinary Tract Infections, Recurrent: has had e coli infections;     Seizure Disorder: did test for seizures , negative, and episode was related to low blood sugar;  syncope dx'd in  did have a headace that lasted at least 24 hours         GYNECOLOGICAL HISTORY:         Problems with menstrual cycles include heavy bleeding.      Contraception: S/P tubal ligation;    Menarche occurred at age 10.    No abnormal Paps    Has never had a mammogram.  Hospitalizations: Never             ADVANCED DIRECTIVES: None         PREVENTIVE HEALTH MAINTENANCE             PAP SMEAR: was last done 4/13/16 with normal results         Surgical History:         Positive for    Breast Augmentation - below the muscle; at age [age].  ;     Positive for    Bilateral Tubal Ligation;         Family History:     Father: Hypertension;  Transient Ischemic Attack; blocked carotids     Mother: Hypertension; Hyperlipidemia     Brother(s): Healthy; 2 brother(s) total         Social History:     Occupation: [employer]      Marital Status:      Children: 2 children         Tobacco/Alcohol/Supplements:     Last Reviewed on 3/02/2020 06:52 PM by Raoul Galeas    Tobacco: Current Smoker: She currently smokes every day, 1-1/2 packs per day.          Alcohol: Frequency: Socially         Substance Abuse History:     Last Reviewed on 3/02/2020 06:52 PM by Raoul Galeas    NEGATIVE         Mental Health History:     Last Reviewed on 3/02/2020 06:52 PM by Raoul Galeas        Communicable Diseases (eg STDs):     Last Reviewed on 3/02/2020 06:52 PM by Raoul Galeas        Current Problems:     Last Reviewed on 3/02/2020 06:52 PM by Raoul Galeas    Rosacea    Cigarette smoking    Gross hematuria     Acne rosacea    Urinary frequency    Rosacea, unspecified    Low back pain    Low back pain    High risk heterosexual behavior    High risk sexual behavior    Migraine, unspecified, not intractable, without status migrainosus    Acute sinusitis, unspecified    Encounter for screening for depression    Cervicalgia    Acute maxillary sinusitis, unspecified        Immunizations:     None        Allergies:     Last Reviewed on 3/02/2020 06:52 PM by Raoul Galeas    No Known Allergies.         Current Medications:     Last Reviewed on 3/02/2020 06:52 PM by Raoul Galeas Allergy 24 hr     cyclobenzaprine 5 mg oral tablet [take 1 tablet (5 mg) by oral route 3 times per day as needed]        Objective:        Vitals:         Current: 3/2/2020 3:12:46 PM    Ht:  5 ft, 6 in;  Wt: 163.2 lbs;  BMI: 26.3T: 98.4 F (oral);  BP: 111/73 mm Hg (right arm, sitting);  P: 82 bpm (right arm (BP Cuff), sitting)        Exams:     PHYSICAL EXAM:     GENERAL: vital signs recorded - well developed, well nourished;  well groomed;  no apparent distress, appears minimally ill;     EYES: extraocular movements intact; conjunctiva and cornea are normal; PERRLA;     E/N/T: OROPHARYNX: posterior pharynx shows erythema;     RESPIRATORY: normal respiratory rate and pattern with no distress; normal breath sounds with no rales, rhonchi, wheezes or rubs;     CARDIOVASCULAR: normal rate; rhythm is regular;  no systolic murmur; no edema;     GASTROINTESTINAL: nontender; normal bowel sounds;     LYMPHATIC: no enlargement of cervical or facial nodes;     MUSCULOSKELETAL: normal gait; normal overall tone     NEUROLOGIC: mental status: alert and oriented x 3; GROSSLY INTACT     PSYCHIATRIC:  appropriate affect and demeanor; normal speech pattern; grossly normal memory;         Assessment:         J01.00   Acute maxillary sinusitis, unspecified           Plan:         Acute maxillary sinusitis, unspecifiedPt seems to have viral URI. This is possibly flu although less virulent virus is more likely. Pt declines flu swab. Pt advised if Sx do not improve to call and let me know, additional testing such as flu swab, CXR or Abx can be considered at that time.             Charge Capture:         Primary Diagnosis:     J01.00  Acute maxillary sinusitis, unspecified           Orders:      77566  Office/outpatient visit; established patient, level 3  (In-House)

## 2021-05-18 NOTE — PROGRESS NOTES
Destiney Thomas  1975     Office/Outpatient Visit    Visit Date: Tue, Jan 21, 2020 09:55 am    Provider: Praneeth Aguilera MD (Assistant: Cleopatra Gutierrez MA)    Location: [facility]        Electronically signed by Praneeth Aguilera MD on  02/06/2020 09:16:26 AM                             Subjective:        CC: Ms. Thomas is a 44 year old White female.  Patient presents today with complaints of frequent migranes, sinus pressure and drainage X 1 week;         HPI:           PHQ-9 Depression Screening: Completed form scanned and in chart; Total Score 2           With regard to the acute upper respiratory infection, unspecified, these have been present for the past 7 days.  The symptoms include chest congestion, Chills, cough, dizziness, headache, nasal congestion, nasal discharge and vomiting.  She denies fever.  She denies exposure to ill contacts.  She has already tried to relieve the symptoms with antihistamines ( Claritin ) and ibuprofen.  Medical history is significant for cigarette smoking.        Patient reports that for the last several days she has had a throbbing headache with associated photophobia and mild nausea.  She is tried over-the-counter analgesics without improvement. No blurry vision.  No weakness or numbness/tingling.    ROS:     CONSTITUTIONAL:  Positive for chills.   Negative for fatigue or fever.      E/N/T:  Positive for nasal congestion, frequent rhinorrhea and sinus pressure.   Negative for ear pain, tinnitus, hoarseness or sore throat.      CARDIOVASCULAR:  Positive for dizziness.   Negative for chest pain, palpitations or edema.      RESPIRATORY:  Positive for cough and chest congestion.   Negative for dyspnea.      GASTROINTESTINAL:  Positive for nausea and vomiting.   Negative for abdominal pain or diarrhea.      MUSCULOSKELETAL:  Negative for arthralgias and myalgias.      INTEGUMENTARY/BREAST:  Negative for rash, breast mass and skin changes of breast.       NEUROLOGICAL:  Positive for headaches.   Negative for weakness.          Past Medical History / Family History / Social History:         Last Reviewed on 2020 09:16 AM by Praneeth Aguilera    Past Medical History:         Asthma: moderate severity; had inhaler , and treatment began  13 years ago, last episode in ;     Urinary Tract Infections, Recurrent: has had e coli infections;     Seizure Disorder: did test for seizures , negative, and episode was related to low blood sugar;  syncope dx'd in  did have a headace that lasted at least 24 hours         GYNECOLOGICAL HISTORY:        Problems with menstrual cycles include heavy bleeding.      Contraception: S/P tubal ligation;    Menarche occurred at age 10.    No abnormal Paps    Has never had a mammogram.  Hospitalizations: Never             ADVANCED DIRECTIVES: None         PREVENTIVE HEALTH MAINTENANCE             PAP SMEAR: was last done 14 with normal results         Surgical History:         Positive for    Breast Augmentation - below the muscle; at age [age].  ;     Positive for    Bilateral Tubal Ligation;         Family History:     Father: Hypertension;  Transient Ischemic Attack; blocked carotids     Mother: Hypertension; Hyperlipidemia     Brother(s): Healthy; 2 brother(s) total         Social History:     Occupation: [employer]      Marital Status:      Children: 2 children         Tobacco/Alcohol/Supplements:     Last Reviewed on 2020 09:16 AM by Praneeth Aguilera    Tobacco: Current Smoker: She currently smokes every day, 1-1/2 packs per day.          Alcohol: Frequency: Socially         Substance Abuse History:     Last Reviewed on 2020 09:16 AM by Praneeth Aguilera    NEGATIVE         Mental Health History:     Last Reviewed on 2020 09:16 AM by Praneeth Aguilera        Communicable Diseases (eg STDs):     Last Reviewed on 2020 09:16 AM by Praneeth Aguilera        Current Problems:     Last Reviewed on  "2/06/2020 09:16 AM by Praneeth Aguilera    Rosalam[place]    Cigarette smoking    Gross hematuria     Acne rosacea    Urinary frequency    Rosacea, unspecified    Low back pain    Low back pain    High risk heterosexual behavior    High risk sexual behavior    Migraine, unspecified, not intractable, without status migrainosus    Acute sinusitis, unspecified    Encounter for screening for depression    Cervicalgia        Immunizations:     None        Allergies:     Last Reviewed on 2/06/2020 09:16 AM by Praneeth Aguilera    No Known Allergies.        Current Medications:     Last Reviewed on 2/06/2020 09:16 AM by Praneeth Aguilera    Claritin Allergy 24 hr         Objective:        Vitals:         Current: 1/21/2020 9:58:48 AM    Ht:  5 ft, 6 in;  Wt: 161.6 lbs;  BMI: 26.1T: 98 F (oral);  BP: 134/81 mm Hg (right arm, sitting);  P: 78 bpm (right arm (BP Cuff), sitting)        Exams:     PHYSICAL EXAM:     GENERAL: vital signs recorded - well developed, well nourished;  no apparent distress;     EYES: conjunctiva and cornea are normal;     E/N/T: EARS: both TMs are have fluid behind them;  NOSE: nasal mucosa is edematous and erythematous;  bilateral maxillary sinus tenderness present; OROPHARYNX: posterior pharynx shows no exudate and erythema;     NECK: trachea is midline; thyroid is non-palpable;     RESPIRATORY: Clear to auscultation bilaterally; no rales (\"crackles\") present; no rhonchi; no wheezes;     CARDIOVASCULAR: normal rate; rhythm is regular;  No murmurs. clicks, gallops or rubs appreciated; no edema;     LYMPHATIC: no enlargement of cervical or facial nodes; no supraclavicular nodes;     BREAST/INTEGUMENT: No significant rashes, lesions or suspicious moles within limits of examination;     NEUROLOGIC: Grossly intact; mental status: alert and oriented x 3;     PSYCHIATRIC: appropriate affect and demeanor; normal speech pattern; Normal behavior;         Procedures: Reglan 10mg was given in pt's left hip. Lot number: [id number] Exp: 8-1-2020 ,, Benadryl 25mg tablet PO was also given to pt, LOT: [id number] Exp: 2/2022    ./[facility], rma.    Migraine, unspecified, not intractable, without status migrainosus    1. Toradol 60 mg given IM in the right hip; administered by [facility];  lot number [id number]; expires 03/21             Assessment:         J01.90   Acute sinusitis, unspecified       G43.909   Migraine, unspecified, not intractable, without status migrainosus       Z13.31   Encounter for screening for depression           ORDERS:         Meds Prescribed:       [New Rx] amoxicillin-pot clavulanate 875-125 mg oral tablet [take 1 tablet by oral route every 12 hours], #14 (fourteen) tablets, Refills: 0 (zero)         Other Orders:       [id number]  Therapeutic injection  (In-House)            [id number]  Therapeutic injection  (In-House)              Reglan 10 mg/2 ml  (In-House)              Depression screen negative  (In-House)              Toradol, per 15 mg  (x4)          Injection, metoclopramide HCl, up to 10 mg  (x1)                  Plan:         Acute sinusitis, unspecified- Will start Augmentin 875-125 mg twice daily x7 days.  Over-the-counter decongestant/cough suppressants as needed.  Tylenol and/or Motrin as needed for fever/discomfort.  Advise good hydration and rest.  ED/return precautions given.          Prescriptions:       [New Rx] amoxicillin-pot clavulanate 875-125 mg oral tablet [take 1 tablet by oral route every 12 hours], #14 (fourteen) tablets, Refills: 0 (zero)         Migraine, unspecified, not intractable, without status migrainosus- Headache cocktail given in office today including Benadryl, Reglan and Toradol.  ED/return precautions given.    Narcotic or pain medication Toradol 60 mg and Miscellaneous benadryl 25 mg by mouth; Reglan 10 mg/2ml           Orders:       [id number]  Therapeutic injection  (In-House)              Toradol, per 15 mg  (x4)        [id number]  Therapeutic injection  (In-House)               Injection, metoclopramide HCl, up to 10 mg  (x1)          Reglan 10 mg/2 ml  (In-House)              Encounter for screening for depression    MIPS PHQ-9 Depression Screening: Completed form scanned and in chart; Total Score 2; Negative Depression Screen           Orders:         Depression screen negative  (In-House)                  Charge Capture:         Primary Diagnosis:     J01.90  Acute sinusitis, unspecified           Orders:      [id number]  Office/outpatient visit; established patient, level 4  (In-House)              G43.909  Migraine, unspecified, not intractable, without status migrainosus           Orders:      [id number]  Therapeutic injection  (In-House)              Toradol, per 15 mg  (x4)        [id number]  Therapeutic injection  (In-House)              Injection, metoclopramide HCl, up to 10 mg  (x1)          Reglan 10 mg/2 ml  (In-House)              Z13.31  Encounter for screening for depression           Orders:        Depression screen negative  (In-House)

## 2021-05-18 NOTE — PROGRESS NOTES
"Destiney Thomas  1975     Office/Outpatient Visit    Visit Date: Tue, Jun 9, 2020 10:49 am    Provider: Rocío Perez N.P. (Assistant: Robb Riley, )    Location: [facility]        Electronically signed by Rocío Perez N.P. on  06/09/2020 02:27:01 PM                             Subjective:        CC: Ms. Thomas is a 44 year old White female.  presents today due to sinus pressure, vomiting x1 week         HPI:           Ms. Thomas presents with acute upper respiratory infection, unspecified.  These have been present for the past one week.  The symptoms include productive cough, headache, nasal congestion, purulent nasal discharge and frontal sinus pain and pressure.  She denies body aches, fever or wheezing.  She denies exposure to ill contacts.  She has already tried to relieve the symptoms with claritin.  Medical history is significant for allergies and frequent sinusitis.            In regard to the vomiting, unspecified,     7-8 times in past one  week.  states due to mucus draining down throat and usual for her with sinusitis recurrent each spring x last 3 yrs.      ROS:     CONSTITUTIONAL:  Positive for fatigue.   Negative for chills or fever.      E/N/T:  Positive for ear pain ( right ), nasal congestion, frequent rhinorrhea and sinus pressure.   Negative for sore throat.      CARDIOVASCULAR:  Negative for chest pain, palpitations, tachycardia, orthopnea, and edema.      RESPIRATORY:  Positive for recent cough ( with scant amounts of purulent sputum ).   Negative for frequent wheezing.      GASTROINTESTINAL:  Positive for nausea and vomiting.   Negative for abdominal pain, acid reflux symptoms, constipation, diarrhea or hematochezia.      MUSCULOSKELETAL:  Negative for arthralgias, back pain, and myalgias.      NEUROLOGICAL:  Positive for headaches ( \"sinus\" ).   Negative for fainting or paresthesias.          Past Medical History / Family History / Social History:         " Last Reviewed on 3/02/2020 06:52 PM by Raoul Galeas    Past Medical History:         Asthma: moderate severity; had inhaler , and treatment began  13 years ago, last episode in ;     Urinary Tract Infections, Recurrent: has had e coli infections;     Seizure Disorder: did test for seizures , negative, and episode was related to low blood sugar;  syncope dx'd in  did have a headace that lasted at least 24 hours         GYNECOLOGICAL HISTORY:        Problems with menstrual cycles include heavy bleeding.      Contraception: S/P tubal ligation;    Menarche occurred at age 10.    No abnormal Paps    Has never had a mammogram.  Hospitalizations: Never             ADVANCED DIRECTIVES: None         PREVENTIVE HEALTH MAINTENANCE             PAP SMEAR: was last done 14 with normal results         Surgical History:         Positive for    Breast Augmentation - below the muscle; at age [age].  ;     Positive for    Bilateral Tubal Ligation;         Family History:     Father: Hypertension;  Transient Ischemic Attack; blocked carotids     Mother: Hypertension; Hyperlipidemia     Brother(s): Healthy; 2 brother(s) total         Social History:     Occupation: [employer]      Marital Status:      Children: 2 children         Tobacco/Alcohol/Supplements:     Last Reviewed on 3/02/2020 06:52 PM by Raoul Galeas    Tobacco: Current Smoker: She currently smokes every day, 1-1/2 packs per day.          Alcohol: Frequency: Socially         Substance Abuse History:     Last Reviewed on 3/02/2020 06:52 PM by Raoul Galeas    NEGATIVE         Mental Health History:     Last Reviewed on 3/02/2020 06:52 PM by Raoul Galeas        Communicable Diseases (eg STDs):     Last Reviewed on 3/02/2020 06:52 PM by Raoul Galeas        Current Problems:     Last Reviewed on 3/02/2020 06:52 PM by Raoul Galeas    Rosacea, unspecified    Low back pain    High risk heterosexual behavior     Acute sinusitis, unspecified    Encounter for screening for depression    Migraine, unspecified, not intractable, without status migrainosus    Cervicalgia    Acute maxillary sinusitis, unspecified        Immunizations:     None        Allergies:     Last Reviewed on 3/02/2020 06:52 PM by Raoul Galeas    No Known Allergies.        Current Medications:     Last Reviewed on 3/02/2020 06:52 PM by Raoul Galeas    Claritin Allergy 24 hr     cyclobenzaprine 5 mg oral tablet [take 1 tablet (5 mg) by oral route 3 times per day as needed]        Objective:        Vitals:         Historical:     3/2/2020  BP:   111/73 mm Hg ( (right arm, , sitting, );) 3/2/2020  Wt:   163.2lbs    Current: 6/9/2020 10:49:33 AM    Ht:  5 ft, 6 in;  Wt: 163.8 lbs;  BMI: 26.4T: 97.06 F (oral);  BP: 119/69 mm Hg (right arm, sitting);  P: 69 bpm (right arm (BP Cuff), sitting)        Exams:     PHYSICAL EXAM:     GENERAL: tired-appearing;     E/N/T: EARS: bilateral TMs are normal;  NOSE: nasal mucosa is erythematous;  bilateral frontal sinus tenderness present; OROPHARYNX: posterior pharynx, including tonsils, tongue, and uvula are normal;     RESPIRATORY: normal respiratory rate and pattern with no distress; normal breath sounds with no rales, rhonchi, wheezes or rubs;     CARDIOVASCULAR: normal rate; rhythm is regular;     GASTROINTESTINAL: nontender; normal bowel sounds; no organomegaly;     LYMPHATIC: no enlargement of cervical or facial nodes;     MUSCULOSKELETAL:  Normal range of motion, strength and tone;     NEUROLOGIC: mental status: alert and oriented x 3; GROSSLY INTACT     PSYCHIATRIC:  appropriate affect and demeanor; normal speech pattern; grossly normal memory;         Assessment:         J01.10   Acute frontal sinusitis, unspecified       R11.10   Vomiting, unspecified       R05   Cough           ORDERS:         Meds Prescribed:       [New Rx] Zofran 4 mg oral tablet [take 1 tablet q 4-6 hrs prn nausea or vomiting], #14  (fourteen) tablets, Refills: 0 (zero)       [New Rx] Augmentin 875-125 mg oral tablet [take 1 tablet by oral route every 12 hours], #14 (fourteen) tablets, Refills: 0 (zero)         Radiology/Test Orders:       [id number]  COVID 19 Testing [facility]  (Send-Out)                      Plan:         Acute frontal sinusitis, unspecified        RECOMMENDATIONS given include: rest, increase oral fluid intake, and followup if not improving.            Prescriptions:       [New Rx] Augmentin 875-125 mg oral tablet [take 1 tablet by oral route every 12 hours], #14 (fourteen) tablets, Refills: 0 (zero)         Vomiting, unspecified        RECOMMENDATIONS given include: get plenty of rest, maintain a clear liquid diet, resume intake of solid foods gradually, and Go to the ER if worse.            Prescriptions:       [New Rx] Zofran 4 mg oral tablet [take 1 tablet q 4-6 hrs prn nausea or vomiting], #14 (fourteen) tablets, Refills: 0 (zero)         Cough          Orders:       [id number]  COVID 19 Testing [facility]  (Send-Out)                  Patient Recommendations:        For  Acute frontal sinusitis, unspecified:    Get plenty of rest. Increase oral fluid intake.          For  Vomiting, unspecified:    Get plenty of rest.     Maintain a diet consisting of clear liquids (clear beverages, broth, gelatin, flavored ices, pediatric electrolyte solutions, sports drinks, etc.).  Avoid solid foods or formula. After 4-8 hours have passed without vomiting, you may gradually advance your diet to include bland foods, such as saltine crackers or bread, or dilute formula.              Charge Capture:         Primary Diagnosis:     J01.10  Acute frontal sinusitis, unspecified           Orders:      [id number]  Office/outpatient visit; established patient, level 3  (In-House)              R11.10  Vomiting, unspecified     R05  Cough

## 2021-05-18 NOTE — PROGRESS NOTES
"Destiney Thomas  1975     Office/Outpatient Visit    Visit Date: Thu, Aug 6, 2020 09:18 am    Provider: Kalie Choudhary N.P. (Assistant: Luly Wilhelm LPN)    Location: [facility]        Electronically signed by Kalie Choudhary N.P. on  08/06/2020 10:56:51 AM                             Subjective:        CC: Ms. Thomas is a 45 year old White female.  She presents with sinus symptoms and neck pain.          HPI: 45-year-old female presenting to clinic with multiple complaints.  She states that she has had sinus pressure and drainage x2 to 3 weeks.  She takes Claritin daily. No known ill contacts.        She is also complaining of chronic back pain.  She states it started approximately 5 to 6 years ago.  Patient went to physical therapy and it subsided.  Approximately 3 years ago it returned and worsened at the beginning of this year.  She has been to physical therapy again and it has not improved.  No imaging on file.  She cannot recall if she has ever had x-rays.  No known injury.  She is also complaining of generalized back muscle spasms.  She states that Flexeril works well.  She is needing a refill.        She is also complaining of bilateral great toes being thick and yellow.  She states it has been this way for years.  She has not tried any over-the-counter remedies for symptoms.    ROS:     CONSTITUTIONAL:  Negative for chills, fatigue and fever.      EYES:  Negative for blurred vision.      E/N/T:  Negative for ear pain and sore throat.      CARDIOVASCULAR:  Negative for chest pain, palpitations and pedal edema.      RESPIRATORY:  Positive for recent cough ( seems to be exacerbated by allergies ).   Negative for dyspnea.      GASTROINTESTINAL:  Positive for nausea ( \"From drainage\" ).   Negative for abdominal pain, diarrhea or vomiting.      GENITOURINARY:  Negative for dysuria and urinary incontinence.      MUSCULOSKELETAL:  Negative for limb pain.      " INTEGUMENTARY/BREAST:  Negative for rash.      NEUROLOGICAL:  Negative for dizziness, paresthesias and weakness.          Past Medical History / Family History / Social History:         Last Reviewed on 2020 09:27 AM by Kalie Choudhary    Past Medical History:         Asthma: moderate severity; had inhaler , and treatment began  13 years ago, last episode in ;     Urinary Tract Infections, Recurrent: has had e coli infections;     Seizure Disorder: did test for seizures , negative, and episode was related to low blood sugar;  syncope dx'd in  did have a headace that lasted at least 24 hours         GYNECOLOGICAL HISTORY:        Problems with menstrual cycles include heavy bleeding.      Contraception: S/P tubal ligation;    Menarche occurred at age 10.    No abnormal Paps    Has never had a mammogram.  Hospitalizations: Never             ADVANCED DIRECTIVES: None         PREVENTIVE HEALTH MAINTENANCE             PAP SMEAR: was last done 14 with normal results         Surgical History:         Positive for    Breast Augmentation - below the muscle; at age [age].  ;     Positive for    Bilateral Tubal Ligation;         Family History:     Father: Hypertension;  Transient Ischemic Attack; blocked carotids     Mother: Hypertension; Hyperlipidemia     Brother(s): Healthy; 2 brother(s) total         Social History:     Occupation: [employer]      Marital Status:      Children: 2 children         Tobacco/Alcohol/Supplements:     Last Reviewed on 2020 09:27 AM by Kalie Choudhary    Tobacco: Current Smoker: She currently smokes every day, 1-1/2 packs per day.          Alcohol: Frequency: Socially         Substance Abuse History:     Last Reviewed on 2020 09:27 AM by Kalie Choudhary    NEGATIVE         Mental Health History:     Last Reviewed on 3/02/2020 06:52 PM by Raoul Galeas        Communicable Diseases (eg STDs):     Last Reviewed on 3/02/2020 06:52 PM  by Raoul Galeas        Immunizations:     None        Allergies:     Last Reviewed on 8/06/2020 09:27 AM by Kalie Choudhary    No Known Allergies.        Current Medications:     Last Reviewed on 8/06/2020 09:27 AM by Kalie Choudhary    Claritin Allergy 24 hr     Zofran 4 mg oral tablet [take 1 tablet q 4-6 hrs prn nausea or vomiting]    CYCLOBENZAPRINE 10 MG TABLET        Objective:        Vitals:         Current: 8/6/2020 9:22:09 AM    Ht:  5 ft, 6 in;  Wt: 159.4 lbs;  BMI: 25.7T: 98.1 F (oral);  BP: 119/84 mm Hg (right arm, sitting);  P: 78 bpm (right arm (BP Cuff), sitting)        Exams:     PHYSICAL EXAM:     GENERAL: vital signs recorded - well developed, well nourished;  well groomed;  no apparent distress;     EYES: extraocular movements intact; conjunctiva and cornea are normal; PERRLA;     E/N/T: NOSE: bilateral maxillary and bilateral frontal sinus tenderness present;     RESPIRATORY: normal respiratory rate and pattern with no distress; normal breath sounds with no rales, rhonchi, wheezes or rubs;     CARDIOVASCULAR: normal rate; rhythm is regular;  no systolic murmur; no edema;     LYMPHATIC: no enlargement of cervical or facial nodes;     MUSCULOSKELETAL: normal gait; pain with range of motion in: back flexion, extension, and lateral flexion;  normal overall tone     NEUROLOGIC: mental status: alert and oriented x 3;     PSYCHIATRIC:  appropriate affect and demeanor; normal speech pattern; grossly normal memory;         Assessment:         M54.2   Cervicalgia       M54.5   Low back pain       J01.90   Acute sinusitis, unspecified       B35.1   Tinea unguium           ORDERS:         Meds Prescribed:       [New Rx] Augmentin 875-125 mg oral tablet [take 1 tablet by oral route every 12 hours], #20 (twenty) tablets, Refills: 0 (zero)       [New Rx] predniSONE 20 mg oral tablet [take 1 tab (20mg )  by oral route once daily], #5 (five) tablets, Refills: 0 (zero)       [Refilled] Zofran 4 mg oral  tablet [take 1 tablet q 4-6 hrs prn nausea or vomiting], #14 (fourteen) tablets, Refills: 0 (zero)       [Refilled] cyclobenzaprine 10 mg oral tablet [take 1 tablet by mouth every 8 hours as needed for muscle spasm], #30 (thirty) tablets, Refills: 0 (zero)       [New Rx] fluticasone propionate 50 mcg/actuation Intranasal Spray, Suspension [inhale 1 spray (50 mcg) in each nostril by intranasal route daily], #16 (sixteen) milliliters, Refills: 5 (five)       [New Rx] efinaconazole 10 % Topical Solution With Applicator [apply to affected toenail(s) by topical route once daily x 48 weeks], #8 (eight) milliliters, Refills: 5 (five)         Radiology/Test Orders:       [id number]  Radiologic examination, spine, cervical; minimum of four views  (Send-Out)            53333  Radiologic examination, spine, lumbosacral;  minimum of four views  (Send-Out)            77723  Radiologic examination, spine; thoracic, three views  (Send-Out)                      Plan:         CervicalgiaWill notify patient of results.        RADIOLOGY:  I have ordered a C-Spine x-ray series to be done today.            Orders:       [id number]  Radiologic examination, spine, cervical; minimum of four views  (Send-Out)              Low back painWill notify patient of results. Muscle relaxers as needed.  Notify clinic with any acute concerns or issues.        RADIOLOGY:  I have ordered Lumbar/Sacral Spine X-ray and Thoracic Spine to be done today.            Orders:       [id number]  Radiologic examination, spine, lumbosacral;  minimum of four views  (Send-Out)            94101  Radiologic examination, spine; thoracic, three views  (Send-Out)              Acute sinusitis, unspecifiedComplete prescribed antibiotic and steroid.  Continue to take Claritin daily and start Flonase daily.  Zofran as needed for nausea.  Patient to return to clinic with any acute concerns or issues.  Patient verbalizes understanding is no further questions upon discharge.           Prescriptions:       [New Rx] Augmentin 875-125 mg oral tablet [take 1 tablet by oral route every 12 hours], #20 (twenty) tablets, Refills: 0 (zero)       [New Rx] predniSONE 20 mg oral tablet [take 1 tab (20mg )  by oral route once daily], #5 (five) tablets, Refills: 0 (zero)       [Refilled] Zofran 4 mg oral tablet [take 1 tablet q 4-6 hrs prn nausea or vomiting], #14 (fourteen) tablets, Refills: 0 (zero)       [Refilled] cyclobenzaprine 10 mg oral tablet [take 1 tablet by mouth every 8 hours as needed for muscle spasm], #30 (thirty) tablets, Refills: 0 (zero)       [New Rx] fluticasone propionate 50 mcg/actuation Intranasal Spray, Suspension [inhale 1 spray (50 mcg) in each nostril by intranasal route daily], #16 (sixteen) milliliters, Refills: 5 (five)         Tinea unguiumPatient to use topical medication and if there is no improvement, will send to podiatry.          Prescriptions:       [New Rx] efinaconazole 10 % Topical Solution With Applicator [apply to affected toenail(s) by topical route once daily x 48 weeks], #8 (eight) milliliters, Refills: 5 (five)             Charge Capture:         Primary Diagnosis:     M54.2  Cervicalgia           Orders:      51853  Office/outpatient visit; established patient, level 4  (In-House)              M54.5  Low back pain     J01.90  Acute sinusitis, unspecified     B35.1  Tinea unguium

## 2021-05-18 NOTE — PROGRESS NOTES
Destiney Thomas  1975     Office/Outpatient Visit    Visit Date: Fri, Aug 14, 2020 09:26 am    Provider: Kalie Choudhary N.P. (Assistant: Harriet Buckner RN)    Location: [facility]        Electronically signed by Kalie Choudhary N.P. on  08/14/2020 12:42:59 PM                             Subjective:        CC: Ms. Thomas is a 45 year old White female.  Preventative Exam;         HPI: 45-year-old female presenting to clinic for annual physical exam.  Patient has not had a screening mammogram.  She has not had a Pap smear since 2016.  She cannot recall when her last labs were.  X-rays and were reviewed.  Patient has been going to physical therapy x2 weeks and states that her right arm numbness and tingling has not improved.  She still states that she has chronic neck pain.  The muscle relaxers do seem to help her though. Medical, surgical, family and social history reviewed. She states that acute sinusitis has improved.  She still has just a few days of her antibiotic to take. Patient does smoke 1-1/2 packs/day and has since she was a teenager.  She has no desire to quit at this time.    ROS:     CONSTITUTIONAL:  Negative for fatigue, fever and night sweats.      EYES:  Negative for blurred vision.      E/N/T:  Negative for ear pain, nasal congestion and sinus pressure.      CARDIOVASCULAR:  Negative for chest pain, palpitations and pedal edema.      RESPIRATORY:  Negative for recent cough and dyspnea.      GASTROINTESTINAL:  Negative for abdominal pain, constipation, diarrhea, nausea and vomiting.      GENITOURINARY:  Negative for dysuria and urinary incontinence.      MUSCULOSKELETAL:  Positive for back pain ( chronic ).   Negative for myalgias.      INTEGUMENTARY/BREAST:  Negative for rash.      NEUROLOGICAL:  Negative for dizziness, paresthesias and weakness.      PSYCHIATRIC:  Negative for anxiety, depression, sleep disturbance and suicidal thoughts.          Past Medical History /  Family History / Social History:         Last Reviewed on 2020 09:55 AM by Kalie Choudhary    Past Medical History:                 PAST MEDICAL HISTORY         Asthma: moderate severity; had inhaler , and treatment began  13 years ago, last episode in ;     Urinary Tract Infections, Recurrent: has had e coli infections;     Seizure Disorder: did test for seizures , negative, and episode was related to low blood sugar;  syncope dx'd in  did have a headace that lasted at least 24 hours         GYNECOLOGICAL HISTORY:        Problems with menstrual cycles include heavy bleeding.      Contraception: S/P tubal ligation;    Menarche occurred at age 10.    No abnormal Paps    Has never had a mammogram.  Hospitalizations: Never             ADVANCED DIRECTIVES: None         PREVENTIVE HEALTH MAINTENANCE             PAP SMEAR: was last done 14 with normal results         Surgical History:         Positive for    Breast Augmentation - below the muscle; at age .  ;     Positive for    Bilateral Tubal Ligation and    uterine ablation;;         Family History:     Father: Hypertension; Hyperlipidemia; Myocardial Infarction;  Transient Ischemic Attack; blocked carotids;  Anxiety     Mother: Hypertension; Hyperlipidemia;  lupus;  Anxiety; Depression; Bipolar Disorder     Brother(s): Healthy; 2 brother(s) total     Paternal Grandfather: Medical history unknown     Paternal Grandmother: Medical history unknown     Maternal Grandfather: Medical history unknown     Maternal Grandmother: Alzheimer's Disease         Social History:     Occupation: [employer]      Marital Status:      Children: 2 children         Tobacco/Alcohol/Supplements:     Last Reviewed on 2020 09:55 AM by Kalie Choudhary    Tobacco: Current Smoker: She currently smokes every day, 1-1/2 packs per day.          Alcohol: Frequency: Socially         Substance Abuse History:     Last Reviewed on 2020  09:55 AM by Kalie Choudhary        Marijuana: Current use.          Mental Health History:     Last Reviewed on 8/14/2020 09:55 AM by Kalie Choudhary        Generalized Anxiety Disorder         Communicable Diseases (eg STDs):     Last Reviewed on 3/02/2020 06:52 PM by Raoul Galeas        Immunizations:     None        Allergies:     Last Reviewed on 8/14/2020 09:55 AM by Kalie Choudhary    No Known Allergies.        Current Medications:     Last Reviewed on 8/14/2020 09:55 AM by Kalie Choudhary    Claritin Allergy 24 hr     cyclobenzaprine 10 mg oral tablet [take 1 tablet by mouth every 8 hours as needed for muscle spasm]    Zofran 4 mg oral tablet [take 1 tablet q 4-6 hrs prn nausea or vomiting]    Augmentin 875-125 mg oral tablet [take 1 tablet by oral route every 12 hours]    fluticasone propionate 50 mcg/actuation Intranasal Spray, Suspension [inhale 1 spray (50 mcg) in each nostril by intranasal route daily]    efinaconazole 10 % Topical Solution With Applicator [apply to affected toenail(s) by topical route once daily x 48 weeks]        Objective:        Vitals:         Current: 8/14/2020 9:30:56 AM    Ht:  5 ft, 6 in;  Wt: 166.4 lbs;  BMI: 26.9T: 97.3 F (temporal);  BP: 111/60 mm Hg (right arm, sitting);  P: 68 bpm (right arm (BP Cuff), sitting)        Exams:     PHYSICAL EXAM:     GENERAL: vital signs recorded - well developed, well nourished;  well groomed;  no apparent distress;     EYES: extraocular movements intact; conjunctiva and cornea are normal; PERRLA;     NECK: range of motion is normal; thyroid exam reveals not enlarged;     RESPIRATORY: normal respiratory rate and pattern with no distress; normal breath sounds with no rales, rhonchi, wheezes or rubs;     CARDIOVASCULAR: normal rate; rhythm is regular;  no systolic murmur; no edema;     GASTROINTESTINAL: nontender; normal bowel sounds;     LYMPHATIC: no enlargement of cervical or facial nodes;     MUSCULOSKELETAL: normal gait; normal  overall tone     NEUROLOGIC: mental status: alert and oriented x 3;     PSYCHIATRIC:  appropriate affect and demeanor; normal speech pattern; grossly normal memory;         Assessment:         Z00.00   Encounter for general adult medical examination without abnormal findings       Z12.31   Encounter for screening mammogram for malignant neoplasm of breast       L71.9   Rosacea, unspecified       M50.90   Cervical disc disorder, unspecified, unspecified cervical region       B35.1   Tinea unguium       F17.210   Nicotine dependence, cigarettes, uncomplicated           ORDERS:         Radiology/Test Orders:       [id number]  Screening mammography bi 2-view inc CAD  (Send-Out)              Lab Orders:       [id number]  [facility] PHYSICAL: CMP, CBC, TSH, LIPID: [id number], [id number], [id number], [id number]  (Send-Out)            75455  JAIME - [facility] GILES w/Reflex  (Send-Out)            17389  NUSCR - [facility] C-reactive protein CRP  (Send-Out)            20344  SED - [facility] Sedimentation rate, non-automated ESR  (Send-Out)            60220  RHF- [facility] Rheumatoid factor  (Send-Out)              Procedures Ordered:       REFER  Referral to Specialist or Other Facility  (Send-Out)                      Plan:         Encounter for general adult medical examination without abnormal findingsWill notify patient of results.  Patient to follow-up as needed.    LABORATORY:  Labs ordered to be performed today include PHYSICAL PANEL; CMP, CBC, TSH, LIPID.            Orders:       [id number]  [facility] PHYSICAL: CMP, CBC, TSH, LIPID: [id number], [id number], [id number], [id number]  (Send-Out)              Encounter for screening mammogram for malignant neoplasm of breastWill notify patient of results.        RADIOLOGY:  I have ordered Mammogram Screening to be done today.            Orders:       79112  Screening mammography bi 2-view inc CAD  (Send-Out)              Rosacea, unspecifiedWill notify patient of results.  Patient to follow-up as needed.    LABORATORY:  Labs ordered to be performed  today include GILES with Reflex, CRP, Quantitative, ESR, and rheumatoid factor.            Orders:       11254  JAIME - [facility] GILES w/Reflex  (Send-Out)            79072  NUSCR - [facility] C-reactive protein CRP  (Send-Out)            11819  SED - [facility] Sedimentation rate, non-automated ESR  (Send-Out)            85243  RHF- [facility] Rheumatoid factor  (Send-Out)              Cervical disc disorder, unspecified, unspecified cervical regionPatient states that physical therapy has not helped numbness and tingling in right hand/fingers.  She is to be referred to neurosurgeon for further evaluation.  Patient will be notified of appointment.  She is to call clinic as needed.  Patient agrees with plan of care.        REFERRALS:  Referral initiated to a neurosurgeon ( Melva WALLER, [facility] Neurosurgeon ).            Orders:       REFER  Referral to Specialist or Other Facility  (Send-Out)              Tinea unguiumHand written prescription for Ciclopirox nail lacquer given to pt as medication rx at last visit was not covered by insurance. RTC as needed. Pt v/u and had no further questions upon d/c.         Nicotine dependence, cigarettes, uncomplicatedEncourage smoking cessation.  Patient does not want to quit at this time.            Charge Capture:         Primary Diagnosis:     Z00.00  Encounter for general adult medical examination without abnormal findings           Orders:      26842  Preventive medicine, established patient, age 40-64 years  (In-House)              Z12.31  Encounter for screening mammogram for malignant neoplasm of breast           Orders:      02739-91  Office/outpatient visit; established patient, level 3  (In-House)              L71.9  Rosacea, unspecified     M50.90  Cervical disc disorder, unspecified, unspecified cervical region     B35.1  Tinea unguium     F17.210  Nicotine dependence, cigarettes, uncomplicated

## 2021-05-18 NOTE — PROGRESS NOTES
Destiney Thomas 1975     Office/Outpatient Visit    Visit Date: Fri, Mar 23, 2018 03:23 pm    Provider: Martha Jackson N.P. (Assistant: Aline Ruelas LPN)    Location: [facility]        Electronically signed by Martha Jackson N.P. on  2018 09:44:31 AM                             SUBJECTIVE:        CC:     Ms. Thomas is a 42 year old White female.  She presents with Cough, h/a, eyes burn, chest congestion x 4 days.  Nausea and vomiting 3 days ago..          HPI:         Ms. Thomas presents with cough.  Pt states productive green cough x 4-5 days. States sinus congestion, sore throat. States having a few wpisoded of N/V. States taking otc sinus meds without much relief. Denies fever.      ROS:     CONSTITUTIONAL:  Negative for chills, fatigue, fever, and weight change.      EYES:  Negative for blurred vision.      CARDIOVASCULAR:  Negative for chest pain, orthopnea, paroxysmal nocturnal dyspnea and pedal edema.      RESPIRATORY:  Positive for recent cough.   Negative for dyspnea.      GASTROINTESTINAL:  Negative for abdominal pain, constipation, diarrhea, nausea and vomiting.      NEUROLOGICAL:  Negative for dizziness, headaches, paresthesias, and weakness.      PSYCHIATRIC:  Negative for anxiety, depression, and sleep disturbances.          PM/FM/:     Last Reviewed on 2016 09:06 AM by Martha Jackson    Past Medical History:         Asthma: moderate severity; had inhaler , and treatment began  13 years ago, last episode in ;     Urinary Tract Infections, Recurrent: has had e coli infections;     Seizure Disorder: did test for seizures , negative, and episode was related to low blood sugar;  syncope dx'd in  did have a headace that lasted at least 24 hours         GYNECOLOGICAL HISTORY:        Problems with menstrual cycles include heavy bleeding.      Contraception: S/P tubal ligation;    Menarche occurred at age 10.    No abnormal Paps    Has  never had a mammogram.  Hospitalizations: Never             ADVANCED DIRECTIVES: None         PREVENTIVE HEALTH MAINTENANCE             PAP SMEAR: was last done 4/13/16 with normal results         Surgical History:         Positive for    Breast Augmentation - below the muscle; at age [age].  ;     Positive for    Bilateral Tubal Ligation;         Family History:     Father: Hypertension;  Transient Ischemic Attack; blocked carotids     Mother: Hypertension; Hyperlipidemia     Brother(s): Healthy; 2 brother(s) total         Social History:     Occupation: [employer]      Marital Status:      Children: 2 children         Tobacco/Alcohol/Supplements:     Last Reviewed on 4/08/2016 09:06 AM by Martha Jackson    Tobacco: Current Smoker: She currently smokes every day, 1 pack per day.          Alcohol: Frequency: Socially         Substance Abuse History:     Last Reviewed on 4/08/2016 09:06 AM by Martha Jackson    NEGATIVE         Mental Health History:     Last Reviewed on 4/08/2016 09:06 AM by Martha Jackson        Communicable Diseases (eg STDs):     Last Reviewed on 4/08/2016 09:06 AM by Martha Jackson            Current Problems:     Last Reviewed on 4/08/2016 09:06 AM by Martha Jackson    High risk sexual behavior     Low back pain     Acne rosacea     Urinary frequency     Gross hematuria     Cigarette smoking     Rosacea         Immunizations:     None        Allergies:     Last Reviewed on 3/23/2018 03:27 PM by Aline Ruelas      No Known Drug Allergies.         Current Medications:     Last Reviewed on 3/23/2018 03:28 PM by Aline Ruelas      No Known Medications.         OBJECTIVE:        Vitals:         Current: 3/23/2018 3:26:25 PM    Ht:  5 ft, 6 in;  Wt: 166.8 lbs;  BMI: 26.9    T: 98.9 F (oral);  BP: 115/64 mm Hg (right arm, sitting);  P: 77 bpm (right arm (BP Cuff), sitting)        Exams:     PHYSICAL EXAM:     GENERAL: vital signs recorded - well developed,  well nourished;  no apparent distress;     EYES: extraocular movements intact; conjunctiva and cornea are normal; PERRLA;     E/N/T: EARS:  normal external auditory canals and tympanic membranes;  grossly normal hearing; NOSE: nasal mucosa is erythematous;  bilateral maxillary and bilateral frontal sinus tenderness present; OROPHARYNX: oral mucosa reveals erythema;     NECK: range of motion is normal; thyroid is non-palpable;     RESPIRATORY: normal respiratory rate and pattern with no distress; decreased breath sounds; rhonchi heard throughout;     CARDIOVASCULAR: normal rate; rhythm is regular;  no systolic murmur; no edema;     NEUROLOGICAL:  cranial nerves, motor and sensory function, reflexes, gait and coordination are all intact;     PSYCHIATRIC:  appropriate affect and demeanor; normal speech pattern; grossly normal memory;         Lab/Test Results:             Influenza A and B:  Negative (03/23/2018),     Performed by::  angelo (03/23/2018),             ASSESSMENT:           466.0   J20.9  Acute bronchitis              DDx:         ORDERS:         Meds Prescribed:       Augmentin (Amoxicillin/Clavulanate) 875mg/125mg Tablet 1 tab bid X 10 days  #20 (Twenty) tablet(s) Refills: 0       Medrol (Methylprednisolone) 4mg Dosepak Take as directed with food  #1 (One) dose pack Refills: 0       Bromfed-DM (Brompheniramine/Dextromethorphan/Pseudoephedrine) Syrup 1  to 2  tsp po every 4-6 hrs prn cough/congestion.  #240 (Two [place] and Forty) ml Refills: 0         Radiology/Test Orders:       [id number]  Radiologic exam chest 2 views  (Send-Out)           Lab Orders:       [id number]  Infectious agent antigen detection by immunoassay; Influenza  (In-House)         00615-10  Infectious agent antigen detection by immunoassay; Influenza  (In-House)                   PLAN:          Acute bronchitis         RADIOLOGY:  I have ordered a chest x-ray (PA and lateral) to be done today.      FOLLOW-UP: Advised to call if there is no  improvement..   Chronic visit follow up           Prescriptions:       Augmentin (Amoxicillin/Clavulanate) 875mg/125mg Tablet 1 tab bid X 10 days  #20 (Twenty) tablet(s) Refills: 0       Medrol (Methylprednisolone) 4mg Dosepak Take as directed with food  #1 (One) dose pack Refills: 0       Bromfed-DM (Brompheniramine/Dextromethorphan/Pseudoephedrine) Syrup 1  to 2  tsp po every 4-6 hrs prn cough/congestion.  #240 (Two [place] and Forty) ml Refills: 0           Orders:       [id number]  Infectious agent antigen detection by immunoassay; Influenza  (In-House)         36874-30  Infectious agent antigen detection by immunoassay; Influenza  (In-House)         75601  Radiologic exam chest 2 views  (Send-Out)             Patient Education Handouts:       Acute Bronchitis              Patient Recommendations:        For  Acute bronchitis:                     APPOINTMENT INFORMATION:        Monday Tuesday Wednesday Thursday Friday Saturday Sunday            Time:___________________AM  PM   Date:_____________________             CHARGE CAPTURE:           Primary Diagnosis:     466.0 Acute bronchitis            J20.9    Acute bronchitis, unspecified              Orders:          [id number]   Office/outpatient visit; established patient, level 3  (In-House)             03895   Infectious agent antigen detection by immunoassay; Influenza  (In-House)             70993 -59  Infectious agent antigen detection by immunoassay; Influenza  (In-House)

## 2021-05-18 NOTE — PROGRESS NOTES
"Destiney Thomas. 1975     Office/Outpatient Visit    Visit Date: Fri, Oct 4, 2019 03:47 pm    Provider: Praneeth Aguilera MD (Assistant: Catherine Landon MA)    Location: [facility]        Electronically signed by Praneeth Aguilera MD on  10/04/2019 10:15:52 PM                             SUBJECTIVE:        CC:     Ms. Thomas is a 44 year old White female.  She presents with knot on right heel, that has been there a long time. Also injury to right wrist recently.          HPI:         PHQ-9 Depression Screening: Completed form scanned and in chart; Total Score 6     Patient c/o of right wrist pain. She notes that her dog jerked her right wrist into a pole while she was walking him. This occurred 1 week ago. Worse with prolonged movement. Pain is achy in quality. No swelling or bruising. No numbness or tingling. She does endorse mild  strength weakness.  No prior history of injury to the area.     Patient c/o of right heel pain. She has a \"bump\" on her right heel that intermittently swells and becomes painful. This usually occurs after she works several days in a row. She also reports mild pain and ankle stiffness first thing in the morning. No history of trauma or imjury to the area. No bruising.     ROS:     CONSTITUTIONAL:  Negative for chills and fever.      CARDIOVASCULAR:  Negative for chest pain, dizziness, palpitations and edema.      RESPIRATORY:  Negative for dyspnea and cough.      MUSCULOSKELETAL:  Positive for right wrist pain and right heel pain.      NEUROLOGICAL:  Negative for paresthesias and weakness.          PMH/FMH/SH:     Last Reviewed on 10/04/2019 10:15 PM by Praneeth Aguilera    Past Medical History:         Asthma: moderate severity; had inhaler , and treatment began  13 years ago, last episode in 2009;     Urinary Tract Infections, Recurrent: has had e coli infections;     Seizure Disorder: did test for seizures , negative, and episode was related to low blood sugar;  " syncope dx'd in  did have a headace that lasted at least 24 hours         GYNECOLOGICAL HISTORY:        Problems with menstrual cycles include heavy bleeding.      Contraception: S/P tubal ligation;    Menarche occurred at age 10.    No abnormal Paps    Has never had a mammogram.  Hospitalizations: Never             ADVANCED DIRECTIVES: None         PREVENTIVE HEALTH MAINTENANCE             PAP SMEAR: was last done 14 with normal results         Surgical History:         Positive for    Breast Augmentation - below the muscle; at age [age].  ;     Positive for    Bilateral Tubal Ligation;         Family History:     Father: Hypertension;  Transient Ischemic Attack; blocked carotids     Mother: Hypertension; Hyperlipidemia     Brother(s): Healthy; 2 brother(s) total         Social History:     Occupation: [employer]      Marital Status:      Children: 2 children         Tobacco/Alcohol/Supplements:     Last Reviewed on 10/04/2019 10:15 PM by Praneeth Aguilera    Tobacco: Current Smoker: She currently smokes every day, 1-1/2 packs per day.          Alcohol: Frequency: Socially         Substance Abuse History:     Last Reviewed on 10/04/2019 10:15 PM by Praneeth Aguilera    NEGATIVE         Mental Health History:     Last Reviewed on 10/04/2019 10:15 PM by Praneeth Aguilera        Communicable Diseases (eg STDs):     Last Reviewed on 10/04/2019 10:15 PM by Praneeth Aguilera            Current Problems:     Last Reviewed on 10/04/2019 10:15 PM by Praneeth Aguilera    High risk sexual behavior     Low back pain     Acne rosacea     Urinary frequency     Gross hematuria     Cigarette smoking     Rosacea     Heel pain     Wrist pain     Screening for depression         Immunizations:     None        Allergies:     Last Reviewed on 10/04/2019 10:15 PM by Praneeth Aguilera      No Known Drug Allergies.         Current Medications:     Last Reviewed on 10/04/2019 10:15 PM by Praneeth Aguilera    Claritin Allergy 24  "hr         OBJECTIVE:        Vitals:         Current: 10/4/2019 3:54:50 PM    Ht:  5 ft, 6 in;  Wt: 164.8 lbs;  BMI: 26.6    T: 97.9 F (oral);  BP: 126/65 mm Hg (right arm, sitting);  P: 72 bpm (right arm (BP Cuff), sitting)        Exams:     PHYSICAL EXAM:     GENERAL: vital signs recorded - well developed, well nourished;  no apparent distress;     EYES: conjunctiva and cornea are normal;     RESPIRATORY: Clear to auscultation bilaterally; no rales (\"crackles\") present; no rhonchi; no wheezes;     CARDIOVASCULAR: normal rate; rhythm is regular;  No murmurs. clicks, gallops or rubs appreciated; no edema;     BREAST/INTEGUMENT: No significant rashes, lesions or suspicious moles within limits of examination;     MUSCULOSKELETAL: muscle strength: 5/5 in all major muscle groups;  Tenderness to palpation of right volar surface of wrist 5 cm proximal to wrist crease; No deformoty, bruising or swelling noted; Full ROM in flexion, extension, supination and pronation of right wrist; Pain produced with resisted flexion and extension; Tenderness to palpation at insertion of right achilles tendon; No edema or bruising noted; full ROM of right ankle in plantat flexion, dorsiflexion, inversion and eversion     NEUROLOGIC: Grossly intact; mental status: alert and oriented x 3;     PSYCHIATRIC: appropriate affect and demeanor; normal speech pattern; Normal behavior;         ASSESSMENT           719.43   M25.531  Wrist pain              DDx: - Post traumatic pain vs sprain vs strain/tendinitis vs fracture; Favor traumatic pain vs sprain/strain     729.5   M79.671  Heel pain              DDx: Achilles tendinitis vs apophysitis vs heel spur     V79.0   Z13.31  Screening for depression - Negative for depression              DDx:         ORDERS:         Meds Prescribed:       Ibuprofen 800mg Tablet Take 1 tablet(s) by mouth q8h prn  #30 (Thirty) tablet(s) Refills: 0         Radiology/Test Orders:       [id number]  Right radiologic " examination; wrist, complete three views  (Send-Out)           Other Orders:         Depression screen negative  (In-House)                   PLAN:          Wrist pain - Will order x-ray today to rule out fracture or dislocation.  Pain control with ibuprofen up to 800 mg 3 times daily and/or Tylenol up to 1000 mg 3 times daily.  Ice affected area for 20 minutes on/20 minutes off as often as able/tolerated.  Work excuse provided for restricted work duty for the next week to limit repetitive flexion and extension of the right wrist.  Also advised patient that she can  a a wrist brace from the pharmacy for choosing to wear while at work.  If symptoms worsen or fail to improve, will consider PT referral, more advanced imaging and/or specialist referral.         RADIOLOGY:  I have ordered a right wrist x-ray to be done today.            Prescriptions:       Ibuprofen 800mg Tablet Take 1 tablet(s) by mouth q8h prn  #30 (Thirty) tablet(s) Refills: 0           Orders:       [id number]  Right radiologic examination; wrist, complete three views  (Send-Out)            Heel pain - Pain control and icing as above.  If symptoms worsen or fail to improve, will consider imaging and/or specialist/PT referral.          Screening for depression     MIPS PHQ-9 Depression Screening: Completed form scanned and in chart; Total Score 6; Positive Depression Screen but after further evaluation the patient does not have a diagnosis of depression.            Orders:         Depression screen negative  (In-House)               CHARGE CAPTURE           **Please note: ICD descriptions below are intended for billing purposes only and may not represent clinical diagnoses**        Primary Diagnosis:         719.43 Wrist pain            M25.531    Pain in right wrist              Orders:          [id number]   Office/outpatient visit; established patient, level 4  (In-House)           729.5 Heel pain            M79.671    Pain in right foot     V79.0 Screening for depression            Z13.31    Encounter for screening for depression              Orders:             Depression screen negative  (In-House)

## 2021-05-24 ENCOUNTER — OFFICE VISIT CONVERTED (OUTPATIENT)
Dept: NEUROLOGY | Facility: CLINIC | Age: 46
End: 2021-05-24
Attending: NURSE PRACTITIONER

## 2021-05-24 ENCOUNTER — CONVERSION ENCOUNTER (OUTPATIENT)
Dept: NEUROLOGY | Facility: CLINIC | Age: 46
End: 2021-05-24

## 2021-05-27 ENCOUNTER — OFFICE VISIT CONVERTED (OUTPATIENT)
Dept: FAMILY MEDICINE CLINIC | Age: 46
End: 2021-05-27
Attending: NURSE PRACTITIONER

## 2021-06-05 NOTE — PROGRESS NOTES
Progress Note      Patient Name: Destiney Thomas   Patient ID: [id number]   Sex: Female   YOB: 1975    Primary Care Provider: Kalie ZARAGOZA   Referring Provider: Cori Wood PA-C    Visit Date: May 24, 2021    Provider: MILA Bull   Location: [facility]   Location Address: [address] [place] [address]  [id number]   Location Phone: [phone]          Chief Complaint  · Follow-up     Patient following up after EMG       History Of Present Illness  The patient is a 45 year old /White female who is in the office for followup appointment.      NCV with Dr. Recinos showed bilateral carpal syndrome, moderate on the right and mild left. She is wearing her braces, which has helped quite significantly. She is no longer having paresthesias in the hands. Mentions a recent shooting pain into the 4th finger on the right with stiffness. Also having some lateral forearm pain. Denies weakness or loss of dexterity in the hands.     She also has a history of chronic neck pain with radiating pain into the back of the head. This causes headaches most days, with migraine headache 3-4 times per month. Migraine will have associated nausea and vomiting with photophobia. She will take Phenergan and a muscle relaxant when the headaches are severe.       Past Medical History  Lumbago/low back pain         Past Surgical History  Breast augmentation         Medication List  cyclobenzaprine 5 mg oral tablet; Flomax 0.4 mg oral capsule         Allergy List  NO KNOWN DRUG ALLERGIES         Family Medical History  Stroke         Social History  Tobacco (Current every day)         Review of Systems  · Constitutional  o Admits  o : fatigue, weight gain  · Eyes  o Admits  o : blurred vision  · HENT  o Admits  o : nasal congestion  · Respiratory  o Admits  o : shortness of breath  · Neurologic  o Admits  o : headache, falls, dizziness/vertigo, difficulty with  "sleep, weakness  · Musculoskeletal  o Admits  o : spasms, pain radiating to arm, neck stiffness/pain, muscle aches, weakness  · All Others Negative      Vitals  Date Time BP Position Site L\R Cuff Size HR RR TEMP (F) WT  HT  BMI kg/m2 BSA m2 O2 Sat FR L/min FiO2 HC       05/24/2021 08:47 AM        97.4           05/24/2021 09:05 /83 Sitting    73 - R 93 97.4 169lbs 2oz 5'  5.5\" 27.72 1.88 100 %            Physical Examination  · Constitutional  o Appearance  o : well-nourished, well developed, alert, in no acute distress  · Respiratory  o Respiratory Effort  o : breathing unlabored  · Cardiovascular  o Peripheral Vascular System  o :   § Extremities  § : no cyanosis, clubbing or edema  · Neurologic  o Mental Status Examination  o :   § Orientation  § : grossly oriented to person, place and time  o Motor Examination  o :   § RUE Strength  § : strength normal  § RUE Motor Function  § : tone normal, muscle bulk normal, no abnormal movements noted  § LUE Strength  § : strength normal  § LUE Motor Function  § : tone normal, muscle bulk normal, no abnormal movements noted  o Reflexes  o :   § RUE  § : biceps reflex 2, triceps reflex 2, brachioradialis reflex 2, Quintana neg  § LUE  § : biceps reflex 2, triceps reflex 2, brachioradialis reflex 2, Quintana neg  o Sensation  o :   § Light Touch  § : sensation intact to light touch in extremities  o Gait and Station  o :   § Gait Screening  § : gait within normal limits  · Psychiatric  o Mood and Affect  o : mood normal, affect appropriate     TTP in the cervical paraspinals           Assessment  · Migraine headache     346.90/G43.909  · Cervicogenic headache     784.0/R51.9  · Carpal tunnel syndrome, bilateral     354.0/G56.03  · Cervical spondylosis     721.0/M47.812       Pt with cervical spondylosis with chronic neck pain and headaches. We discussed a referral to pain management for median nerve blocks. She is hesitant to have these. She is having severe migraines and " daily headaches. Will refer to Neurology for management of her migraines.    Regarding her CTS, her symptoms have improved and she wishes to hold off on surgery for now.     She will follow up in 8 weeks.       Plan  · Orders  o NEUROLOGY CONSULTATION. (NEURO) - 346.90/G43.909, 784.0/R51.9 - 05/24/2021   Refer to MILA Le for migraine and cervicogenic headaches  · Medications  o Medications have been Reconciled  o Transition of Care or Provider Policy  · Instructions  o Encouraged to follow-up with Primary Care Provider for preventative care.  o The ROS and the PFSH were reviewed at today's visit.  o Call or return to office if symptoms worsen or persist.   o Refer to Neurology for Migraine Headache.  o Continue wrist splints at bedtime.  o Follow up in 8 weeks.            Electronically Signed by: MILA Bull -Author on May 24, 2021 09:57:02 AM

## 2021-06-09 RX ORDER — ONDANSETRON 4 MG/1
TABLET, FILM COATED ORAL
COMMUNITY

## 2021-06-09 RX ORDER — CYCLOBENZAPRINE HCL 5 MG
TABLET ORAL
COMMUNITY
End: 2021-06-21

## 2021-06-09 RX ORDER — TAMSULOSIN HYDROCHLORIDE 0.4 MG/1
CAPSULE ORAL
Status: ON HOLD | COMMUNITY
End: 2021-08-04

## 2021-06-21 ENCOUNTER — OFFICE VISIT (OUTPATIENT)
Dept: FAMILY MEDICINE CLINIC | Age: 46
End: 2021-06-21

## 2021-06-21 ENCOUNTER — OFFICE VISIT (OUTPATIENT)
Dept: NEUROLOGY | Facility: CLINIC | Age: 46
End: 2021-06-21

## 2021-06-21 VITALS
HEART RATE: 80 BPM | HEIGHT: 66 IN | WEIGHT: 168 LBS | SYSTOLIC BLOOD PRESSURE: 125 MMHG | DIASTOLIC BLOOD PRESSURE: 75 MMHG | BODY MASS INDEX: 27 KG/M2

## 2021-06-21 VITALS
DIASTOLIC BLOOD PRESSURE: 50 MMHG | HEART RATE: 72 BPM | TEMPERATURE: 98.8 F | BODY MASS INDEX: 26.93 KG/M2 | HEIGHT: 66 IN | WEIGHT: 167.6 LBS | SYSTOLIC BLOOD PRESSURE: 103 MMHG

## 2021-06-21 DIAGNOSIS — B35.1 NAIL FUNGUS: ICD-10-CM

## 2021-06-21 DIAGNOSIS — L95.9 VASCULITIS LIMITED TO SKIN: Primary | ICD-10-CM

## 2021-06-21 DIAGNOSIS — F17.200 SMOKER: ICD-10-CM

## 2021-06-21 DIAGNOSIS — G43.019 INTRACTABLE MIGRAINE WITHOUT AURA AND WITHOUT STATUS MIGRAINOSUS: ICD-10-CM

## 2021-06-21 DIAGNOSIS — G43.019 INTRACTABLE MIGRAINE WITHOUT AURA AND WITHOUT STATUS MIGRAINOSUS: Primary | ICD-10-CM

## 2021-06-21 PROBLEM — J30.2 SEASONAL ALLERGIC RHINITIS: Status: ACTIVE | Noted: 2021-06-21

## 2021-06-21 PROCEDURE — 99215 OFFICE O/P EST HI 40 MIN: CPT | Performed by: NURSE PRACTITIONER

## 2021-06-21 PROCEDURE — 99213 OFFICE O/P EST LOW 20 MIN: CPT | Performed by: NURSE PRACTITIONER

## 2021-06-21 RX ORDER — FLUTICASONE PROPIONATE 50 MCG
1 SPRAY, SUSPENSION (ML) NASAL DAILY
COMMUNITY
Start: 2021-05-27 | End: 2023-03-16 | Stop reason: SDUPTHER

## 2021-06-21 RX ORDER — RIZATRIPTAN BENZOATE 5 MG/1
5 TABLET, ORALLY DISINTEGRATING ORAL ONCE AS NEEDED
Qty: 12 TABLET | Refills: 5 | Status: SHIPPED | OUTPATIENT
Start: 2021-06-21 | End: 2021-09-23 | Stop reason: SDUPTHER

## 2021-06-21 RX ORDER — TAVABOROLE TOPICAL SOLUTION, 5% 43.5 MG/ML
1 SOLUTION TOPICAL DAILY
Qty: 10 ML | Refills: 2 | Status: SHIPPED | OUTPATIENT
Start: 2021-06-21 | End: 2021-07-30

## 2021-06-21 RX ORDER — PREDNISONE 10 MG/1
TABLET ORAL
COMMUNITY
Start: 2021-05-27 | End: 2021-06-21

## 2021-06-21 RX ORDER — TIZANIDINE 4 MG/1
4 TABLET ORAL NIGHTLY PRN
Qty: 30 TABLET | Refills: 3 | Status: SHIPPED | OUTPATIENT
Start: 2021-06-21 | End: 2021-09-23 | Stop reason: SDUPTHER

## 2021-06-21 NOTE — ASSESSMENT & PLAN NOTE
Will start tizanidine qHS for preventative therapy of tension based migraine.  Will start Maxalt PRN for abortive therapy.

## 2021-06-21 NOTE — PROGRESS NOTES
"Chief Complaint  Follow-up (referral complication) and Nail Problem    Subjective     {Problem List  Visit Diagnosis   Encounters  Notes  Medications  Labs  Result Review Imaging  Media :23}     Destiney Thomas presents to [facility] to discuss why vascular surgery referral had not been made since last visit.  She states right now her legs feel okay but as soon as she works all day standing on concrete again, they worsen.  They become painful and rash appears again.  Patient also states that antifungal topical medication did help with toenail fungus, but she is still needing to continue medication.          Objective   Vital Signs:   /50 (BP Location: Right arm, Patient Position: Sitting)   Pulse 72   Temp 98.8 °F (37.1 °C) (Oral)   Ht 167.6 cm (66\")   Wt 76 kg (167 lb 9.6 oz)   BMI 27.05 kg/m²     Physical Exam  Vitals reviewed.   Constitutional:       Appearance: Normal appearance. She is well-developed.   HENT:      Head: Normocephalic and atraumatic.   Eyes:      Conjunctiva/sclera: Conjunctivae normal.      Pupils: Pupils are equal, round, and reactive to light.   Cardiovascular:      Rate and Rhythm: Normal rate and regular rhythm.      Heart sounds: No murmur heard.     Pulmonary:      Effort: Pulmonary effort is normal. No respiratory distress.      Breath sounds: Normal breath sounds.   Musculoskeletal:      Cervical back: Full passive range of motion without pain.      Right lower leg: No edema.      Left lower leg: No edema.   Feet:      Right foot:      Toenail Condition: Right toenails are abnormally thick. Fungal disease present.     Left foot:      Toenail Condition: Left toenails are abnormally thick. Fungal disease present.  Skin:     General: Skin is warm and dry.      Findings: No erythema or rash.   Neurological:      Mental Status: She is alert and oriented to person, place, and time.   Psychiatric:         Mood and Affect: Mood and affect " normal.         Behavior: Behavior normal.         Thought Content: Thought content normal.         Judgment: Judgment normal.          Result Review :              Assessment and Plan    Diagnoses and all orders for this visit:    1. Vasculitis limited to skin (Primary)  Assessment & Plan:  Referral initiated again. Sent to Hub. Pt to call office if appt not made by next week. Pt v/u.     Orders:  -     Ambulatory Referral to Vascular Surgery    2. Nail fungus  Assessment & Plan:  Improved since last visit. Continue application and f/u as needed.     Orders:  -     Efinaconazole 10 % solution; Apply 1 application topically Daily.  Dispense: 8 mL; Refill: 2    3. Intractable migraine without aura and without status migrainosus  Assessment & Plan:  Continue seeing neuro as scheduled.          Patient to notify office with any acute concerns or issues.  Patient verbalizes understanding, agrees with plan of care and has no further questions upon discharge.    Please note that portions of this note were completed with a voice recognition program.      Follow Up    Return in about 6 months (around 12/21/2021) for Annual physical.  Patient was given instructions and counseling regarding her condition or for health maintenance advice. Please see specific information pulled into the AVS if appropriate.

## 2021-06-21 NOTE — PATIENT INSTRUCTIONS
Steps to Quit Smoking  Smoking tobacco is the leading cause of preventable death. It can affect almost every organ in the body. Smoking puts you and people around you at risk for many serious, long-lasting (chronic) diseases. Quitting smoking can be hard, but it is one of the best things that you can do for your health. It is never too late to quit.  How do I get ready to quit?  When you decide to quit smoking, make a plan to help you succeed. Before you quit:  · Pick a date to quit. Set a date within the next 2 weeks to give you time to prepare.  · Write down the reasons why you are quitting. Keep this list in places where you will see it often.  · Tell your family, friends, and co-workers that you are quitting. Their support is important.  · Talk with your doctor about the choices that may help you quit.  · Find out if your health insurance will pay for these treatments.  · Know the people, places, things, and activities that make you want to smoke (triggers). Avoid them.  What first steps can I take to quit smoking?  · Throw away all cigarettes at home, at work, and in your car.  · Throw away the things that you use when you smoke, such as ashtrays and lighters.  · Clean your car. Make sure to empty the ashtray.  · Clean your home, including curtains and carpets.  What can I do to help me quit smoking?  Talk with your doctor about taking medicines and seeing a counselor at the same time. You are more likely to succeed when you do both.  · If you are pregnant or breastfeeding, talk with your doctor about counseling or other ways to quit smoking. Do not take medicine to help you quit smoking unless your doctor tells you to do so.  To quit smoking:  Quit right away  · Quit smoking totally, instead of slowly cutting back on how much you smoke over a period of time.  · Go to counseling. You are more likely to quit if you go to counseling sessions regularly.  Take medicine  You may take medicines to help you quit. Some  medicines need a prescription, and some you can buy over-the-counter. Some medicines may contain a drug called nicotine to replace the nicotine in cigarettes. Medicines may:  · Help you to stop having the desire to smoke (cravings).  · Help to stop the problems that come when you stop smoking (withdrawal symptoms).  Your doctor may ask you to use:  · Nicotine patches, gum, or lozenges.  · Nicotine inhalers or sprays.  · Non-nicotine medicine that is taken by mouth.  Find resources  Find resources and other ways to help you quit smoking and remain smoke-free after you quit. These resources are most helpful when you use them often. They include:  · Online chats with a counselor.  · Phone quitlines.  · Printed self-help materials.  · Support groups or group counseling.  · Text messaging programs.  · Mobile phone apps. Use apps on your mobile phone or tablet that can help you stick to your quit plan. There are many free apps for mobile phones and tablets as well as websites. Examples include Quit Guide from the CDC and smokefree.gov    What things can I do to make it easier to quit?    · Talk to your family and friends. Ask them to support and encourage you.  · Call a phone quitline (9-995-QUITNOW), reach out to support groups, or work with a counselor.  · Ask people who smoke to not smoke around you.  · Avoid places that make you want to smoke, such as:  ? Bars.  ? Parties.  ? Smoke-break areas at work.  · Spend time with people who do not smoke.  · Lower the stress in your life. Stress can make you want to smoke. Try these things to help your stress:  ? Getting regular exercise.  ? Doing deep-breathing exercises.  ? Doing yoga.  ? Meditating.  ? Doing a body scan. To do this, close your eyes, focus on one area of your body at a time from head to toe. Notice which parts of your body are tense. Try to relax the muscles in those areas.  How will I feel when I quit smoking?  Day 1 to 3 weeks  Within the first 24 hours,  you may start to have some problems that come from quitting tobacco. These problems are very bad 2-3 days after you quit, but they do not often last for more than 2-3 weeks. You may get these symptoms:  · Mood swings.  · Feeling restless, nervous, angry, or annoyed.  · Trouble concentrating.  · Dizziness.  · Strong desire for high-sugar foods and nicotine.  · Weight gain.  · Trouble pooping (constipation).  · Feeling like you may vomit (nausea).  · Coughing or a sore throat.  · Changes in how the medicines that you take for other issues work in your body.  · Depression.  · Trouble sleeping (insomnia).  Week 3 and afterward  After the first 2-3 weeks of quitting, you may start to notice more positive results, such as:  · Better sense of smell and taste.  · Less coughing and sore throat.  · Slower heart rate.  · Lower blood pressure.  · Clearer skin.  · Better breathing.  · Fewer sick days.  Quitting smoking can be hard. Do not give up if you fail the first time. Some people need to try a few times before they succeed. Do your best to stick to your quit plan, and talk with your doctor if you have any questions or concerns.  Summary  · Smoking tobacco is the leading cause of preventable death. Quitting smoking can be hard, but it is one of the best things that you can do for your health.  · When you decide to quit smoking, make a plan to help you succeed.  · Quit smoking right away, not slowly over a period of time.  · When you start quitting, seek help from your doctor, family, or friends.  This information is not intended to replace advice given to you by your health care provider. Make sure you discuss any questions you have with your health care provider.  Document Revised: 09/11/2020 Document Reviewed: 03/07/2020  Elsevier Patient Education © 2021 [employer]

## 2021-06-21 NOTE — PROGRESS NOTES
"Chief Complaint  Headache    Subjective          Destiney Thomas presents to [facility]  She reports that she developed headaches many years ago. Since that time, her headaches have progressively worsened.   Currently, she reports headaches that are located occipital  and vertex. She characterizes the headaches as 8/10 in severity, throbbing  in nature with associated photophobia, phonophobia and nausea. She reports headaches last 6-8 hours. She reports 6 headache days per month. She endorses associated aura. She endorses vision changes.   Triggers: stress and inadequate sleep   Symptoms improved by: Dark quiet room and Sleep   She states she is sleeping poorly. Reports getting 4 hours of sleep per night. denies snoring. Reports unrefreshing sleep.   Prior prophylactic medications include: none  She  uses abortive therapy such as: ibuprofen  Caffeine Use: 1-2 servings daily  History of Kidney Stones: No  She denies a family history of cerebral aneurysm.     States that she has severe muscle spasms to neck and shoulders and feels this often triggers her migraines.  Is seeing CYNTHIA for chronic neck pain. Has trialed PT previously without good result.       Objective   Vital Signs:   /75 (BP Location: Right arm, Patient Position: Sitting, Cuff Size: Adult)   Pulse 80   Ht 167.6 cm (66\")   Wt 76.2 kg (168 lb)   BMI 27.12 kg/m²     Physical Exam  HENT:      Head: Normocephalic.   Pulmonary:      Effort: Pulmonary effort is normal.   Neurological:      Mental Status: She is alert and oriented to person, place, and time.      Cranial Nerves: Cranial nerves are intact.      Sensory: Sensation is intact.      Motor: Motor function is intact.      Coordination: Coordination is intact.      Deep Tendon Reflexes: Reflexes are normal and symmetric.        Neurologic Exam     Mental Status   Oriented to person, place, and time.        Result Review :               Assessment and " Plan    Diagnoses and all orders for this visit:    1. Intractable migraine without aura and without status migrainosus (Primary)  Assessment & Plan:  Will start tizanidine qHS for preventative therapy of tension based migraine.  Will start Maxalt PRN for abortive therapy.       Other orders  -     tiZANidine (ZANAFLEX) 4 MG tablet; Take 1 tablet by mouth At Night As Needed for Muscle Spasms.  Dispense: 30 tablet; Refill: 3  -     rizatriptan MLT (MAXALT-MLT) 5 MG disintegrating tablet; Place 1 tablet on the tongue 1 (One) Time As Needed for Migraine for up to 30 days. May repeat in 2 hours if needed  Dispense: 12 tablet; Refill: 5    I spent 42 minutes caring for Destiney on this date of service. This time includes time spent by me in the following activities:preparing for the visit, reviewing tests, obtaining and/or reviewing a separately obtained history, performing a medically appropriate examination and/or evaluation , counseling and educating the patient/family/caregiver, ordering medications, tests, or procedures and documenting information in the medical record  Follow Up   Return in about 2 months (around 8/21/2021) for migraine.  Patient was given instructions and counseling regarding her condition or for health maintenance advice. Please see specific information pulled into the AVS if appropriate.

## 2021-06-22 DIAGNOSIS — L95.9 VASCULITIS LIMITED TO SKIN: Primary | ICD-10-CM

## 2021-06-25 ENCOUNTER — TELEPHONE (OUTPATIENT)
Dept: FAMILY MEDICINE CLINIC | Age: 46
End: 2021-06-25

## 2021-06-25 DIAGNOSIS — L95.9 VASCULITIS LIMITED TO SKIN: Primary | ICD-10-CM

## 2021-06-28 ENCOUNTER — TELEPHONE (OUTPATIENT)
Dept: FAMILY MEDICINE CLINIC | Age: 46
End: 2021-06-28

## 2021-07-01 VITALS
DIASTOLIC BLOOD PRESSURE: 65 MMHG | TEMPERATURE: 97.9 F | SYSTOLIC BLOOD PRESSURE: 126 MMHG | WEIGHT: 164.8 LBS | BODY MASS INDEX: 26.48 KG/M2 | HEIGHT: 66 IN | HEART RATE: 72 BPM

## 2021-07-01 VITALS
HEIGHT: 66 IN | TEMPERATURE: 98.9 F | DIASTOLIC BLOOD PRESSURE: 64 MMHG | HEART RATE: 77 BPM | SYSTOLIC BLOOD PRESSURE: 115 MMHG | BODY MASS INDEX: 26.81 KG/M2 | WEIGHT: 166.8 LBS

## 2021-07-01 NOTE — TELEPHONE ENCOUNTER
Spoke to Micaela in referrals vascular does not see pt for this issue, rheum does, Micaela to call pt and clarify./atc

## 2021-07-01 NOTE — TELEPHONE ENCOUNTER
PATIENT CALLED WANTING TO KNOW ABOUT GETTING SCHEDULED FOR THE VASCULAR DOCTOR. SHE STATED THAT WHEN SHE SPOKE WITH THE REFERRAL DEPARTMENT, SHE FOUND OUT THAT SHE WAS SCHEDULED WITH A DOCTOR FOR RHEUMATOID ARTHRITIS. PLEASE CALL PATIENT TO ADVISE.

## 2021-07-02 VITALS
SYSTOLIC BLOOD PRESSURE: 124 MMHG | HEART RATE: 77 BPM | TEMPERATURE: 97.7 F | HEIGHT: 66 IN | WEIGHT: 161.2 LBS | DIASTOLIC BLOOD PRESSURE: 73 MMHG | BODY MASS INDEX: 25.91 KG/M2

## 2021-07-02 VITALS
SYSTOLIC BLOOD PRESSURE: 134 MMHG | BODY MASS INDEX: 25.97 KG/M2 | HEIGHT: 66 IN | DIASTOLIC BLOOD PRESSURE: 81 MMHG | TEMPERATURE: 98 F | WEIGHT: 161.6 LBS | HEART RATE: 78 BPM

## 2021-07-02 VITALS
BODY MASS INDEX: 26.36 KG/M2 | SYSTOLIC BLOOD PRESSURE: 107 MMHG | HEART RATE: 73 BPM | WEIGHT: 164 LBS | HEIGHT: 66 IN | TEMPERATURE: 97.6 F | DIASTOLIC BLOOD PRESSURE: 53 MMHG

## 2021-07-02 VITALS
DIASTOLIC BLOOD PRESSURE: 84 MMHG | BODY MASS INDEX: 25.62 KG/M2 | HEIGHT: 66 IN | WEIGHT: 159.4 LBS | SYSTOLIC BLOOD PRESSURE: 119 MMHG | HEART RATE: 78 BPM | TEMPERATURE: 98.1 F

## 2021-07-02 VITALS
BODY MASS INDEX: 26.23 KG/M2 | WEIGHT: 163.2 LBS | HEIGHT: 66 IN | TEMPERATURE: 98.4 F | HEART RATE: 82 BPM | SYSTOLIC BLOOD PRESSURE: 111 MMHG | DIASTOLIC BLOOD PRESSURE: 73 MMHG

## 2021-07-02 VITALS
HEIGHT: 66 IN | DIASTOLIC BLOOD PRESSURE: 69 MMHG | WEIGHT: 163.8 LBS | SYSTOLIC BLOOD PRESSURE: 119 MMHG | TEMPERATURE: 97.1 F | BODY MASS INDEX: 26.33 KG/M2 | HEART RATE: 69 BPM

## 2021-07-02 VITALS
TEMPERATURE: 97.6 F | HEIGHT: 66 IN | HEART RATE: 86 BPM | DIASTOLIC BLOOD PRESSURE: 64 MMHG | WEIGHT: 162.4 LBS | SYSTOLIC BLOOD PRESSURE: 112 MMHG | BODY MASS INDEX: 26.1 KG/M2

## 2021-07-02 VITALS
TEMPERATURE: 97.3 F | HEART RATE: 68 BPM | SYSTOLIC BLOOD PRESSURE: 111 MMHG | WEIGHT: 166.4 LBS | HEIGHT: 66 IN | DIASTOLIC BLOOD PRESSURE: 60 MMHG | BODY MASS INDEX: 26.74 KG/M2

## 2021-07-02 VITALS
SYSTOLIC BLOOD PRESSURE: 118 MMHG | WEIGHT: 165.4 LBS | HEART RATE: 85 BPM | HEIGHT: 66 IN | TEMPERATURE: 97.3 F | BODY MASS INDEX: 26.58 KG/M2 | DIASTOLIC BLOOD PRESSURE: 80 MMHG

## 2021-07-02 DIAGNOSIS — R60.0 LOCALIZED EDEMA: Primary | ICD-10-CM

## 2021-07-15 VITALS
HEIGHT: 65 IN | TEMPERATURE: 97.4 F | TEMPERATURE: 97.4 F | OXYGEN SATURATION: 100 % | DIASTOLIC BLOOD PRESSURE: 83 MMHG | SYSTOLIC BLOOD PRESSURE: 125 MMHG | HEART RATE: 73 BPM | BODY MASS INDEX: 28.18 KG/M2 | WEIGHT: 169.12 LBS

## 2021-07-18 DIAGNOSIS — L95.9 VASCULITIS LIMITED TO SKIN: Primary | ICD-10-CM

## 2021-07-20 ENCOUNTER — TELEPHONE (OUTPATIENT)
Dept: NEUROSURGERY | Facility: CLINIC | Age: 46
End: 2021-07-20

## 2021-07-22 ENCOUNTER — OFFICE VISIT (OUTPATIENT)
Dept: FAMILY MEDICINE CLINIC | Age: 46
End: 2021-07-22

## 2021-07-22 VITALS
BODY MASS INDEX: 27.64 KG/M2 | HEIGHT: 66 IN | SYSTOLIC BLOOD PRESSURE: 119 MMHG | WEIGHT: 172 LBS | HEART RATE: 88 BPM | DIASTOLIC BLOOD PRESSURE: 62 MMHG

## 2021-07-22 DIAGNOSIS — L95.9 VASCULITIS LIMITED TO SKIN: Primary | ICD-10-CM

## 2021-07-22 PROCEDURE — 99213 OFFICE O/P EST LOW 20 MIN: CPT | Performed by: NURSE PRACTITIONER

## 2021-07-22 RX ORDER — METHYLPREDNISOLONE 4 MG/1
TABLET ORAL
Qty: 21 TABLET | Refills: 0 | Status: SHIPPED | OUTPATIENT
Start: 2021-07-22 | End: 2021-07-30

## 2021-07-22 NOTE — ASSESSMENT & PLAN NOTE
Pt has appt to vascular sx and will leave with derm appt before leaving office today. Steroid to be taken. RTC as needed. FMLA paperwork filled out. Copy for chart.

## 2021-07-22 NOTE — PROGRESS NOTES
"Chief Complaint  Rash (on legs)    Subjective          Destiney Thomas presents to [facility] with reoccurrence of rash, edema, numbness and tingling of legs after work and pushing large items up steep ramp at work. Pt states she has vascular sx appt beginning of August.           Objective   Vital Signs:   /62 (BP Location: Right arm, Patient Position: Sitting)   Pulse 88   Ht 167.6 cm (66\")   Wt 78 kg (172 lb)   BMI 27.76 kg/m²     Physical Exam  Vitals reviewed.   Constitutional:       Appearance: Normal appearance. She is well-developed.   HENT:      Head: Normocephalic and atraumatic.   Eyes:      Conjunctiva/sclera: Conjunctivae normal.      Pupils: Pupils are equal, round, and reactive to light.   Cardiovascular:      Rate and Rhythm: Normal rate and regular rhythm.      Heart sounds: No murmur heard.     Pulmonary:      Effort: Pulmonary effort is normal. No respiratory distress.      Breath sounds: Normal breath sounds.   Abdominal:      General: There is no distension.      Tenderness: There is no abdominal tenderness.   Musculoskeletal:      Cervical back: Full passive range of motion without pain.      Right lower leg: No edema.      Left lower leg: No edema.   Skin:     General: Skin is warm and dry.      Findings: Erythema and rash present.      Comments: Erythemic rash to jefe legs. Trace non pitting edema.   Neurological:      Mental Status: She is alert and oriented to person, place, and time.   Psychiatric:         Mood and Affect: Mood and affect normal.         Behavior: Behavior normal.         Thought Content: Thought content normal.         Judgment: Judgment normal.          Result Review :              Assessment and Plan    Diagnoses and all orders for this visit:    1. Vasculitis limited to skin (Primary)  Assessment & Plan:  Pt has appt to vascular sx and will leave with derm appt before leaving office today. Steroid to be taken. RTC as needed. " FMLA paperwork filled out. Copy for chart.       Other orders  -     methylPREDNISolone (MEDROL) 4 MG dose pack; Take as directed on package instructions.  Dispense: 21 tablet; Refill: 0      Follow Up    Return if symptoms worsen or fail to improve.  Patient was given instructions and counseling regarding her condition or for health maintenance advice. Please see specific information pulled into the AVS if appropriate.

## 2021-07-27 ENCOUNTER — PREP FOR SURGERY (OUTPATIENT)
Dept: OTHER | Facility: HOSPITAL | Age: 46
End: 2021-07-27

## 2021-07-27 ENCOUNTER — OFFICE VISIT (OUTPATIENT)
Dept: NEUROSURGERY | Facility: CLINIC | Age: 46
End: 2021-07-27

## 2021-07-27 VITALS
HEART RATE: 72 BPM | SYSTOLIC BLOOD PRESSURE: 124 MMHG | OXYGEN SATURATION: 100 % | DIASTOLIC BLOOD PRESSURE: 74 MMHG | HEIGHT: 66 IN | WEIGHT: 174.3 LBS | BODY MASS INDEX: 28.01 KG/M2

## 2021-07-27 DIAGNOSIS — G56.03 BILATERAL CARPAL TUNNEL SYNDROME: Primary | ICD-10-CM

## 2021-07-27 DIAGNOSIS — M47.812 CERVICAL SPONDYLOSIS WITHOUT MYELOPATHY: ICD-10-CM

## 2021-07-27 DIAGNOSIS — G43.019 INTRACTABLE MIGRAINE WITHOUT AURA AND WITHOUT STATUS MIGRAINOSUS: ICD-10-CM

## 2021-07-27 PROCEDURE — 99214 OFFICE O/P EST MOD 30 MIN: CPT | Performed by: NURSE PRACTITIONER

## 2021-07-27 RX ORDER — CEFAZOLIN SODIUM 2 G/100ML
2 INJECTION, SOLUTION INTRAVENOUS ONCE
Status: CANCELLED | OUTPATIENT
Start: 2021-07-27 | End: 2021-07-27

## 2021-07-27 NOTE — PROGRESS NOTES
Chief Complaint  Follow-up (neurology consultation)    Subjective          Destiney Thomas who is a 46 y.o. year old female who presents to [facility] for follow up of her neck pain with headaches and carpal tunnel syndrome.    Pt saw Neurology, started on a muscle relaxant at bedtime and rizaptriptan for rescue for her migraines. She has appreciated good improvement with these medications. The Tizanidine is helping her neck pain and stiffness. She has some muscle tightness in the left scapular region.     She is ready to discuss carpal tunnel release on the right. She is wearing her wrist splints at bedtime, which provided relief though she continues to have numbness in the hands, worse on the right. Her NCV with Dr. Recinos showed bilateral CTS, moderate on right and mild left. She is dropping things frequently and has concerns that her right hand is getting weaker.         Interval History per CHRISTIN Ling 5/24/21  The patient is a 45 year old /White female who is in the office for followup appointment.       NCV with Dr. Recinos showed bilateral carpal syndrome, moderate on the right and mild left. She is wearing her braces, which has helped quite significantly. She is no longer having paresthesias in the hands. Mentions a recent shooting pain into the 4th finger on the right with stiffness. Also having some lateral forearm pain. Denies weakness or loss of dexterity in the hands.     She also has a history of chronic neck pain with radiating pain into the back of the head. This causes headaches most days, with migraine headache 3-4 times per month. Migraine will have associated nausea and vomiting with photophobia. She will take Phenergan and a muscle relaxant when the headaches are severe.       Recent Interventions: Neurology consult for migraine. Wrist Splints at bedtime.       Review of Systems   Constitutional: Positive for fatigue.  "  Musculoskeletal: Positive for arthralgias, back pain and myalgias.   Neurological: Positive for weakness, light-headedness, numbness and headache.   All other systems reviewed and are negative.       Objective   Vital Signs:   /74   Pulse 72   Ht 167.6 cm (66\")   Wt 79.1 kg (174 lb 4.8 oz)   SpO2 100%   BMI 28.13 kg/m²       Physical Exam  Vitals reviewed.   Constitutional:       Appearance: Normal appearance.   Neurological:      Mental Status: She is alert and oriented to person, place, and time.      Gait: Gait is intact.      Deep Tendon Reflexes: Strength normal.      Reflex Scores:       Tricep reflexes are 2+ on the right side and 2+ on the left side.       Bicep reflexes are 2+ on the right side and 2+ on the left side.       Brachioradialis reflexes are 2+ on the right side and 2+ on the left side.       Neurologic Exam     Mental Status   Oriented to person, place, and time.   Level of consciousness: alert    Motor Exam   Muscle bulk: normal  Overall muscle tone: normal    Strength   Strength 5/5 throughout.     Sensory Exam   Light touch normal.     Gait, Coordination, and Reflexes     Gait  Gait: normal    Reflexes   Right brachioradialis: 2+  Left brachioradialis: 2+  Right biceps: 2+  Left biceps: 2+  Right triceps: 2+  Left triceps: 2+       Result Review :                 Assessment and Plan    Diagnoses and all orders for this visit:    1. Bilateral carpal tunnel syndrome (Primary)    2. Cervical spondylosis without myelopathy    3. Intractable migraine without aura and without status migrainosus    Risks and benefits of CTR discussed per Dr. Roldan. She wishes to proceed with CTR on the right. We will plan to schedule this. She will follow up 2 weeks post op for suture removal.     Her neck pain is stable and we will continue to monitor.     Neurology is following for her migraine and tension type headaches.      Follow Up   No follow-ups on file.  Patient was given instructions and " counseling regarding her condition or for health maintenance advice.     -Carpal tunnel release on the right  -Follow up 2 weeks post op    Pt. Seen in clinic with Bridgette Holley. Discussed CTR R & B.

## 2021-07-30 RX ORDER — LANOLIN ALCOHOL/MO/W.PET/CERES
1000 CREAM (GRAM) TOPICAL DAILY
COMMUNITY

## 2021-07-30 RX ORDER — GARLIC 200 MG
TABLET ORAL
COMMUNITY
End: 2023-03-16

## 2021-08-02 ENCOUNTER — LAB (OUTPATIENT)
Dept: LAB | Facility: HOSPITAL | Age: 46
End: 2021-08-02

## 2021-08-04 ENCOUNTER — ANESTHESIA (OUTPATIENT)
Dept: PERIOP | Facility: HOSPITAL | Age: 46
End: 2021-08-04

## 2021-08-04 ENCOUNTER — ANESTHESIA EVENT (OUTPATIENT)
Dept: PERIOP | Facility: HOSPITAL | Age: 46
End: 2021-08-04

## 2021-08-04 ENCOUNTER — HOSPITAL ENCOUNTER (OUTPATIENT)
Facility: HOSPITAL | Age: 46
Setting detail: HOSPITAL OUTPATIENT SURGERY
Discharge: HOME | End: 2021-08-04
Attending: NEUROLOGICAL SURGERY | Admitting: NEUROLOGICAL SURGERY

## 2021-08-04 VITALS
WEIGHT: 175.71 LBS | SYSTOLIC BLOOD PRESSURE: 119 MMHG | HEIGHT: 67 IN | HEART RATE: 67 BPM | DIASTOLIC BLOOD PRESSURE: 71 MMHG | BODY MASS INDEX: 27.58 KG/M2 | RESPIRATION RATE: 16 BRPM | TEMPERATURE: 97.5 F | OXYGEN SATURATION: 100 %

## 2021-08-04 DIAGNOSIS — G56.03 BILATERAL CARPAL TUNNEL SYNDROME: ICD-10-CM

## 2021-08-04 PROCEDURE — 25010000002 ONDANSETRON PER 1 MG: Performed by: NURSE ANESTHETIST, CERTIFIED REGISTERED

## 2021-08-04 PROCEDURE — 25010000002 FENTANYL CITRATE (PF) 50 MCG/ML SOLUTION: Performed by: NURSE ANESTHETIST, CERTIFIED REGISTERED

## 2021-08-04 PROCEDURE — 64721 CARPAL TUNNEL SURGERY: CPT | Performed by: NEUROLOGICAL SURGERY

## 2021-08-04 PROCEDURE — 25010000002 DEXAMETHASONE PER 1 MG: Performed by: NURSE ANESTHETIST, CERTIFIED REGISTERED

## 2021-08-04 PROCEDURE — 25010000003 CEFAZOLIN IN DEXTROSE 2-4 GM/100ML-% SOLUTION: Performed by: NEUROLOGICAL SURGERY

## 2021-08-04 PROCEDURE — 25010000002 KETOROLAC TROMETHAMINE PER 15 MG: Performed by: NURSE ANESTHETIST, CERTIFIED REGISTERED

## 2021-08-04 PROCEDURE — 25010000002 MIDAZOLAM PER 1MG: Performed by: ANESTHESIOLOGY

## 2021-08-04 PROCEDURE — 25010000003 LIDOCAINE 1 % SOLUTION: Performed by: NEUROLOGICAL SURGERY

## 2021-08-04 PROCEDURE — 25010000002 PROPOFOL 10 MG/ML EMULSION: Performed by: NURSE ANESTHETIST, CERTIFIED REGISTERED

## 2021-08-04 RX ORDER — DEXAMETHASONE SODIUM PHOSPHATE 4 MG/ML
INJECTION, SOLUTION INTRA-ARTICULAR; INTRALESIONAL; INTRAMUSCULAR; INTRAVENOUS; SOFT TISSUE AS NEEDED
Status: DISCONTINUED | OUTPATIENT
Start: 2021-08-04 | End: 2021-08-04 | Stop reason: SURG

## 2021-08-04 RX ORDER — OXYCODONE HYDROCHLORIDE 5 MG/1
5 TABLET ORAL
Status: DISCONTINUED | OUTPATIENT
Start: 2021-08-04 | End: 2021-08-04 | Stop reason: HOSPADM

## 2021-08-04 RX ORDER — MEPERIDINE HYDROCHLORIDE 25 MG/ML
12.5 INJECTION INTRAMUSCULAR; INTRAVENOUS; SUBCUTANEOUS
Status: DISCONTINUED | OUTPATIENT
Start: 2021-08-04 | End: 2021-08-04 | Stop reason: HOSPADM

## 2021-08-04 RX ORDER — GINSENG 100 MG
CAPSULE ORAL AS NEEDED
Status: DISCONTINUED | OUTPATIENT
Start: 2021-08-04 | End: 2021-08-04 | Stop reason: HOSPADM

## 2021-08-04 RX ORDER — ONDANSETRON 2 MG/ML
INJECTION INTRAMUSCULAR; INTRAVENOUS AS NEEDED
Status: DISCONTINUED | OUTPATIENT
Start: 2021-08-04 | End: 2021-08-04 | Stop reason: SURG

## 2021-08-04 RX ORDER — LIDOCAINE HYDROCHLORIDE 10 MG/ML
INJECTION, SOLUTION INFILTRATION; PERINEURAL AS NEEDED
Status: DISCONTINUED | OUTPATIENT
Start: 2021-08-04 | End: 2021-08-04 | Stop reason: HOSPADM

## 2021-08-04 RX ORDER — GLYCOPYRROLATE 0.2 MG/ML
0.2 INJECTION INTRAMUSCULAR; INTRAVENOUS
Status: COMPLETED | OUTPATIENT
Start: 2021-08-04 | End: 2021-08-04

## 2021-08-04 RX ORDER — MIDAZOLAM HYDROCHLORIDE 2 MG/2ML
2 INJECTION, SOLUTION INTRAMUSCULAR; INTRAVENOUS ONCE
Status: COMPLETED | OUTPATIENT
Start: 2021-08-04 | End: 2021-08-04

## 2021-08-04 RX ORDER — ONDANSETRON 2 MG/ML
4 INJECTION INTRAMUSCULAR; INTRAVENOUS ONCE AS NEEDED
Status: DISCONTINUED | OUTPATIENT
Start: 2021-08-04 | End: 2021-08-04 | Stop reason: HOSPADM

## 2021-08-04 RX ORDER — ACETAMINOPHEN 500 MG
1000 TABLET ORAL ONCE
Status: COMPLETED | OUTPATIENT
Start: 2021-08-04 | End: 2021-08-04

## 2021-08-04 RX ORDER — IBUPROFEN 600 MG/1
600 TABLET ORAL EVERY 6 HOURS PRN
Status: DISCONTINUED | OUTPATIENT
Start: 2021-08-04 | End: 2021-08-04 | Stop reason: HOSPADM

## 2021-08-04 RX ORDER — PROMETHAZINE HYDROCHLORIDE 25 MG/1
25 SUPPOSITORY RECTAL ONCE AS NEEDED
Status: DISCONTINUED | OUTPATIENT
Start: 2021-08-04 | End: 2021-08-04 | Stop reason: HOSPADM

## 2021-08-04 RX ORDER — KETOROLAC TROMETHAMINE 30 MG/ML
INJECTION, SOLUTION INTRAMUSCULAR; INTRAVENOUS AS NEEDED
Status: DISCONTINUED | OUTPATIENT
Start: 2021-08-04 | End: 2021-08-04 | Stop reason: SURG

## 2021-08-04 RX ORDER — LIDOCAINE HYDROCHLORIDE 20 MG/ML
INJECTION, SOLUTION INFILTRATION; PERINEURAL AS NEEDED
Status: DISCONTINUED | OUTPATIENT
Start: 2021-08-04 | End: 2021-08-04 | Stop reason: SURG

## 2021-08-04 RX ORDER — PROMETHAZINE HYDROCHLORIDE 12.5 MG/1
25 TABLET ORAL ONCE AS NEEDED
Status: DISCONTINUED | OUTPATIENT
Start: 2021-08-04 | End: 2021-08-04 | Stop reason: HOSPADM

## 2021-08-04 RX ORDER — HYDROCODONE BITARTRATE AND ACETAMINOPHEN 5; 325 MG/1; MG/1
1 TABLET ORAL EVERY 4 HOURS PRN
Qty: 10 TABLET | Refills: 0 | Status: SHIPPED | OUTPATIENT
Start: 2021-08-04 | End: 2021-09-20

## 2021-08-04 RX ORDER — SODIUM CHLORIDE, SODIUM LACTATE, POTASSIUM CHLORIDE, CALCIUM CHLORIDE 600; 310; 30; 20 MG/100ML; MG/100ML; MG/100ML; MG/100ML
9 INJECTION, SOLUTION INTRAVENOUS CONTINUOUS PRN
Status: DISCONTINUED | OUTPATIENT
Start: 2021-08-04 | End: 2021-08-04 | Stop reason: HOSPADM

## 2021-08-04 RX ORDER — ONDANSETRON 2 MG/ML
4 INJECTION INTRAMUSCULAR; INTRAVENOUS ONCE AS NEEDED
Status: DISCONTINUED | OUTPATIENT
Start: 2021-08-04 | End: 2021-08-04 | Stop reason: SDUPTHER

## 2021-08-04 RX ORDER — MAGNESIUM HYDROXIDE 1200 MG/15ML
LIQUID ORAL AS NEEDED
Status: DISCONTINUED | OUTPATIENT
Start: 2021-08-04 | End: 2021-08-04 | Stop reason: HOSPADM

## 2021-08-04 RX ORDER — FENTANYL CITRATE 50 UG/ML
INJECTION, SOLUTION INTRAMUSCULAR; INTRAVENOUS AS NEEDED
Status: DISCONTINUED | OUTPATIENT
Start: 2021-08-04 | End: 2021-08-04 | Stop reason: SURG

## 2021-08-04 RX ORDER — CEFAZOLIN SODIUM 2 G/100ML
2 INJECTION, SOLUTION INTRAVENOUS ONCE
Status: COMPLETED | OUTPATIENT
Start: 2021-08-04 | End: 2021-08-04

## 2021-08-04 RX ADMIN — ONDANSETRON 4 MG: 2 INJECTION INTRAMUSCULAR; INTRAVENOUS at 07:24

## 2021-08-04 RX ADMIN — ACETAMINOPHEN 1000 MG: 500 TABLET ORAL at 07:13

## 2021-08-04 RX ADMIN — SODIUM CHLORIDE, POTASSIUM CHLORIDE, SODIUM LACTATE AND CALCIUM CHLORIDE 9 ML/HR: 600; 310; 30; 20 INJECTION, SOLUTION INTRAVENOUS at 07:15

## 2021-08-04 RX ADMIN — CEFAZOLIN SODIUM 2 G: 2 INJECTION, SOLUTION INTRAVENOUS at 07:27

## 2021-08-04 RX ADMIN — FENTANYL CITRATE 100 MCG: 50 INJECTION INTRAMUSCULAR; INTRAVENOUS at 07:24

## 2021-08-04 RX ADMIN — GLYCOPYRROLATE 0.2 MG: 0.2 INJECTION INTRAMUSCULAR; INTRAVENOUS at 07:14

## 2021-08-04 RX ADMIN — MIDAZOLAM HYDROCHLORIDE 2 MG: 1 INJECTION, SOLUTION INTRAMUSCULAR; INTRAVENOUS at 07:14

## 2021-08-04 RX ADMIN — OXYCODONE HYDROCHLORIDE 5 MG: 5 TABLET ORAL at 08:38

## 2021-08-04 RX ADMIN — LIDOCAINE HYDROCHLORIDE 50 MG: 20 INJECTION, SOLUTION INFILTRATION; PERINEURAL at 07:24

## 2021-08-04 RX ADMIN — PROPOFOL 125 MCG/KG/MIN: 10 INJECTION, EMULSION INTRAVENOUS at 07:24

## 2021-08-04 RX ADMIN — KETOROLAC TROMETHAMINE 30 MG: 30 INJECTION, SOLUTION INTRAMUSCULAR; INTRAVENOUS at 07:24

## 2021-08-04 RX ADMIN — DEXAMETHASONE SODIUM PHOSPHATE 4 MG: 4 INJECTION INTRA-ARTICULAR; INTRALESIONAL; INTRAMUSCULAR; INTRAVENOUS; SOFT TISSUE at 07:36

## 2021-08-04 NOTE — ADDENDUM NOTE
Addendum  created 08/04/21 1318 by Greg Smith MD    Attestation recorded in Intraprocedure, Intraprocedure Attestations filed

## 2021-08-04 NOTE — ANESTHESIA PREPROCEDURE EVALUATION
Anesthesia Evaluation     Patient summary reviewed and Nursing notes reviewed   NPO Solid Status: > 6 hours  NPO Liquid Status: > 6 hours           Airway   Mallampati: II  TM distance: >3 FB  Neck ROM: full  No difficulty expected  Dental - normal exam     Pulmonary - negative pulmonary ROS and normal exam   Cardiovascular - negative cardio ROS and normal exam        Neuro/Psych  (+) headaches, numbness,     GI/Hepatic/Renal/Endo - negative ROS     Musculoskeletal     Abdominal  - normal exam    Bowel sounds: normal.   Substance History - negative use     OB/GYN negative ob/gyn ROS         Other   arthritis,                      Anesthesia Plan    ASA 2     general   (Possible block barbara op)  intravenous induction     Anesthetic plan, all risks, benefits, and alternatives have been provided, discussed and informed consent has been obtained with: patient.

## 2021-08-04 NOTE — INTERVAL H&P NOTE
H&P reviewed.  The patient was examined and there are no changes to the H&P    No edema, respiration normal    Risks and benefits discussed with patient.

## 2021-08-04 NOTE — ANESTHESIA POSTPROCEDURE EVALUATION
Patient: Destiney Thomas    Procedure Summary     Date: 08/04/21 Room / Location: [facility] OR 05 / [facility] MAIN OR    Anesthesia Start: 0719 Anesthesia Stop: 0813    Procedure: CARPAL TUNNEL RELEASE,Right (Right Wrist) Diagnosis:       Bilateral carpal tunnel syndrome      (Bilateral carpal tunnel syndrome [G56.03])    Surgeons: Babatunde Roldan MD Provider: Greg Smith MD    Anesthesia Type: general ASA Status: 2          Anesthesia Type: general    Vitals  Vitals Value Taken Time   /66 08/04/21 0845   Temp 36.5 °C (97.7 °F) 08/04/21 0845   Pulse 69 08/04/21 0845   Resp 14 08/04/21 0845   SpO2 100 % 08/04/21 0845           Post Anesthesia Care and Evaluation    Patient location during evaluation: bedside  Patient participation: complete - patient participated  Level of consciousness: awake and alert  Pain management: adequate  Airway patency: patent  Anesthetic complications: No anesthetic complications  PONV Status: none  Cardiovascular status: acceptable  Respiratory status: acceptable  Hydration status: acceptable

## 2021-08-04 NOTE — DISCHARGE INSTRUCTIONS
DISCHARGE INSTRUCTIONS  CARPAL TUNNEL RELEASE      ? For your surgery you had:  ? Local anesthesia  ? Monitored anesthesia care  ? You may experience dizziness, drowsiness, or lightheadedness for several hours following surgery.  ? Do not stay alone today or tonight.  ? Limit your activity for 24 hours.  ? Resume your diet slowly.    ? You should not drive or operate machinery, drink alcohol, or sign legally binding documents for 24 hours or while you are taking pain medication.  ? If you had an axillary or regional block, you may not have control of the involved limb for up to 12 hours. Protect the arm with a sling or follow your physician's specific instructions. Carry the upper arm in a sling so that the hand and wrist are above the level of the heart. Elevate affected arm on a pillow when resting.   ? Use ice to affected area for 48-72 hours. Apply 20 minutes on - 20 minutes off. Do NOT apply directly to skin.  ? Exercise fingers frequently by making a full fist and fully straightening the fingers. This will help prevent swelling and stiffness.  ? Do NOT do any heavy lifting, pulling or strenuous activities using the affected hand. [x] Keep splint clean and dry.  [x]  Discontinue ace bandage in 24 hours.  [x] Remove gauze in 3-4 days.  [x] Do NOT submerge in water.  [x] Keep incision area clean and dry.  [x] You may shower  in 24 hours, but keep dressing clean and dry.  NOTIFY YOUR DOCTOR IF YOU EXPERIENCE ANY OF THE FOLLOWING:  ? Temperature greater than 101 degrees Fahrenheit  ? Shaking Chills  ? Redness or excessive drainage from incision  ? Nausea, vomiting and/or pain that is not controlled by prescribed medications  ? Increase in bleeding or bleeding that is excessive  ? Unable to urinate in 6 hours after surgery  ? If unable to reach your doctor, please go to the closest Emergency Room  Last dose of pain medication was given at: oxycodone last at 8:40am, may take norco at 12:40 if needed  You should see  Dr. Roldan for follow-up care  on August 17th at 12:45p.  Phone number: [phone]    SPECIAL INSTRUCTIONS:  -Discontinue ACE (orange stretchy) bandage in 24 hours.  -Discontinue gauze dressing in 3-4 days.  -OK to shower, but keep dressing clean/dry.   -Do not submerge incision until after sutures removed

## 2021-08-04 NOTE — OP NOTE
CARPAL TUNNEL RELEASE  Procedure Report    Patient Name:  Destiney Thomas  YOB: 1975    Date of Surgery:  8/4/2021     Indications: Bilateral carpal tunnel syndrome, desired right carpal tunnel release    Pre-op Diagnosis:   Bilateral carpal tunnel syndrome [G56.03]       Post-Op Diagnosis Codes:     * Bilateral carpal tunnel syndrome [G56.03]    Procedure/CPT® Codes:      Procedure(s):  CARPAL TUNNEL RELEASE,Right    Staff:  Surgeon(s):  Babatunde Roldan MD    Assistant: Gina Ross CSA    Anesthesia: General    Estimated Blood Loss: minimal      Specimen:          None        Findings: Compression of median nerve at the wrist    Complications: None    Description of Procedure: After informed consent was obtained, the patient was brought to the operating room.  After the administration of IV sedation, the  right hand and arm were prepped and draped in the typical fashion.  A timeout was performed.  An incision approximately an inch and a half in length was made in line with the middle finger and starting just distal to the distal wrist crease.  This was taken down through the palmar surface of the hand and the palmar fat.  The transverse carpal ligament was identified just beneath this and divided until the median nerve was encountered.  It was divided distally until fat was reached.  A soft tissue retractor was used to retract the soft tissue proximally and the Penfield 4 was used to dissect the transverse carpal ligament from the underlying median nerve.  A tenotomy scissor was then used to divide the transverse carpal ligament well proximal to the distal wrist crease.  After this a Cleaton elevator passed very freely along the proximal median nerve with no evidence of additional compression.  The nerve was mildly erythematous.  The wound was then irrigated with normal saline.  Hemostasis was assured using the bipolar electrocautery.  Subcutaneous tissue was reapproximated using interrupted  3-0 Vicryl followed by a vertical mattress nylon sutures reapproximate the skin edges.  The wound was dressed with bacitracin, Telfa, strong 4 x 4's, Kerlix and an Ace bandage.  All sponge and needle counts were correct.    Assistant: Gina Ross CSA  was responsible for performing the following activities: Retraction, Suction, Irrigation and Placing Dressing and their skilled assistance was necessary for the success of this case.    Babatunde Roldan MD     Date: 8/4/2021  Time: 08:26 EDT

## 2021-08-05 ENCOUNTER — OFFICE VISIT (OUTPATIENT)
Dept: VASCULAR SURGERY | Facility: HOSPITAL | Age: 46
End: 2021-08-05

## 2021-08-05 VITALS
RESPIRATION RATE: 16 BRPM | TEMPERATURE: 97.2 F | HEART RATE: 80 BPM | SYSTOLIC BLOOD PRESSURE: 120 MMHG | DIASTOLIC BLOOD PRESSURE: 80 MMHG | OXYGEN SATURATION: 98 %

## 2021-08-05 DIAGNOSIS — R21 RASH AND NONSPECIFIC SKIN ERUPTION: Primary | ICD-10-CM

## 2021-08-05 DIAGNOSIS — R60.0 LOCALIZED EDEMA: ICD-10-CM

## 2021-08-05 PROCEDURE — G0463 HOSPITAL OUTPT CLINIC VISIT: HCPCS

## 2021-08-05 PROCEDURE — 99203 OFFICE O/P NEW LOW 30 MIN: CPT | Performed by: SURGERY

## 2021-08-05 PROCEDURE — G0463 HOSPITAL OUTPT CLINIC VISIT: HCPCS | Performed by: SURGERY

## 2021-08-05 NOTE — PROGRESS NOTES
[facility]   HISTORY AND PHYSICAL    Patient Name: Destiney Thomas  : 1975  MRN: [id number]  Primary Care Physician:  Kalie Choudhary APRN      Subjective   Subjective     Chief Complaint: Leg discoloration and swelling    HPI:    Destiney Thomas is a 46 y.o. female who presents with complaints of leg discoloration and swelling.  She states that she has had chronic swelling of the bilateral lower extremities.  The swelling is not too bad and it is tolerable.  Recently for the last several months she has developed a condition where at work when pushing a heavy cart up a ramp, her right leg becomes discolored with red splotches below the knee.  This happens in the front and in the back area of the calf.  This tends to only happen during this time and it resolves within several hours.  There is no pain associated with this.  The splotches appear to be underneath the skin according to the patient.  She denies any claudication or rest pain.  No open wounds on her feet.  She smokes.  The patient showed me pictures of what it looks like it appears to be a purpuric rash.        Personal History     Past Medical History:   Diagnosis Date   • Arthritis     RA   • Carpal tunnel syndrome on right    • Low back pain    • Migraine        Past Surgical History:   Procedure Laterality Date   • BREAST AUGMENTATION     • CARPAL TUNNEL RELEASE Right 2021    Procedure: CARPAL TUNNEL RELEASE,Right;  Surgeon: Babatunde Roldan MD;  Location: [facility];  Service: Neurosurgery;  Laterality: Right;   • ENDOMETRIAL ABLATION N/A    • TUBAL ABDOMINAL LIGATION         Family History: family history includes Alzheimer's disease in her maternal grandmother; Anxiety disorder in her father and mother; Heart disease in her father; Hyperlipidemia in her father and mother; Hypertension in her father and mother; No Known Problems in her maternal grandfather, paternal grandfather, and paternal grandmother; Stroke in her father.  Otherwise pertinent FHx was reviewed and not pertinent to current issue.    Social History:  reports that she has been smoking cigarettes. She started smoking about 30 years ago. She has a 45.00 pack-year smoking history. She has never used smokeless tobacco. She reports current alcohol use. She reports current drug use. Drug: Marijuana.    Home Medications:  Current Outpatient Medications on File Prior to Visit   Medication Sig   • fluticasone (FLONASE) 50 MCG/ACT nasal spray 1 spray by Each Nare route Daily.   • Garlic 200 MG tablet Take  by mouth. Unsure of dosage   • HYDROcodone-acetaminophen (NORCO) 5-325 MG per tablet Take 1 tablet by mouth Every 4 (Four) Hours As Needed for Moderate Pain  (Pain) for up to 10 doses.   • ondansetron (Zofran) 4 MG tablet Zofran 4 mg oral tablet take 2 tablets (8 mg) by oral route 1-2 hours prior to radiation therapy   Suspended   • Potassium 99 MG tablet Take 1 tablet by mouth.   • tiZANidine (ZANAFLEX) 4 MG tablet Take 1 tablet by mouth At Night As Needed for Muscle Spasms.   • vitamin B-12 (CYANOCOBALAMIN) 1000 MCG tablet Take 1,000 mcg by mouth Daily.   • rizatriptan MLT (MAXALT-MLT) 5 MG disintegrating tablet Place 1 tablet on the tongue 1 (One) Time As Needed for Migraine for up to 30 days. May repeat in 2 hours if needed     No current facility-administered medications on file prior to visit.          Allergies:  No Known Allergies    Objective   Objective     Vitals:   Temp:  [97.2 °F (36.2 °C)] 97.2 °F (36.2 °C)  Heart Rate:  [80] 80  Resp:  [16] 16  BP: (120)/(80) 120/80    Physical Exam  Constitutional:       Appearance: Normal appearance.   HENT:      Head: Normocephalic and atraumatic.   Eyes:      Extraocular Movements: Extraocular movements intact.      Pupils: Pupils are equal, round, and reactive to light.   Cardiovascular:      Rate and Rhythm: Normal rate and regular rhythm.      Pulses:           Carotid pulses are 2+ on the right side and 2+ on the left  side.       Radial pulses are 2+ on the right side and 2+ on the left side.        Femoral pulses are 2+ on the right side and 2+ on the left side.       Popliteal pulses are 2+ on the right side and 2+ on the left side.        Dorsalis pedis pulses are 2+ on the right side and 2+ on the left side.        Posterior tibial pulses are 2+ on the right side and 2+ on the left side.   Pulmonary:      Effort: Pulmonary effort is normal.      Breath sounds: Normal breath sounds.   Abdominal:      General: Abdomen is flat. Bowel sounds are normal.      Palpations: Abdomen is soft.   Musculoskeletal:      Cervical back: Normal range of motion and neck supple.   Skin:     General: Skin is warm and dry.   Neurological:      General: No focal deficit present.      Mental Status: She is alert and oriented to person, place, and time.            Result Review    Most notable findings include:     Assessment/Plan   Assessment / Plan     Brief Patient Summary:  Destiney Thomas is a 46 y.o. female who has a skin rash external time to the day.    Diagnoses and all orders for this visit:    1. Rash and nonspecific skin eruption (Primary)    2. Localized edema         Plan:   Explained to the patient that there is no vascular cause for the rash.  I recommend consult with a dermatologist.  No vascular intervention needed at this time.  I recommend compression stockings for the leg swelling.    Thank you for allowing me to see your patient.        Electronically signed by Augusta Torres MD, MD, 08/05/21, 2:10 PM EDT.

## 2021-08-12 ENCOUNTER — TELEPHONE (OUTPATIENT)
Dept: NEUROSURGERY | Facility: CLINIC | Age: 46
End: 2021-08-12

## 2021-08-12 NOTE — TELEPHONE ENCOUNTER
Caller: Destiney Thomas    Relationship: Self    Best call back number: [phone]    What form or medical record are you requesting: FMLA    Who is requesting this form or medical record from you: PT EMPLOYER    How would you like to receive the form or medical records (pick-up, mail, fax): FAX TO NUMBER ON FORMS    Timeframe paperwork needed: ASAP    Additional notes:     FMLA FORMS DROPPED OF 8/9/2021, PT WOULD LIKE TO KNOW STATUS/ETA OF FORMS.      AS OF 8/12/2021 EMPLOYER HAS NOT RECEIVED FORM.  FMLA FORMS NEED TO BE SENT IN ORDER FOR PT TO GET PAID AND LEAVE TO CONTINUE TO BE APPROVED.         PLEASE CALL PT WHEN FORMS HAS BEEN FAXED.      PLEASE REVIEW AND ADVISE.

## 2021-08-17 ENCOUNTER — OFFICE VISIT (OUTPATIENT)
Dept: NEUROSURGERY | Facility: CLINIC | Age: 46
End: 2021-08-17

## 2021-08-17 VITALS
WEIGHT: 177.8 LBS | DIASTOLIC BLOOD PRESSURE: 68 MMHG | SYSTOLIC BLOOD PRESSURE: 127 MMHG | HEIGHT: 67 IN | BODY MASS INDEX: 27.91 KG/M2

## 2021-08-17 DIAGNOSIS — G56.03 BILATERAL CARPAL TUNNEL SYNDROME: Primary | ICD-10-CM

## 2021-08-17 PROCEDURE — 99024 POSTOP FOLLOW-UP VISIT: CPT | Performed by: NEUROLOGICAL SURGERY

## 2021-08-17 NOTE — PROGRESS NOTES
Destiney Thomas is a 46 y.o. female that presents with Post-op       She is S/P right CTR. Her numbness and tingling is notably improved.       Review of Systems   Musculoskeletal: Positive for myalgias.        Vitals:    08/17/21 1247   BP: 127/68        Physical Exam  Musculoskeletal:      Comments: WHSS   Neurological:      Mental Status: She is alert.        Sutures removed       Assessment and Plan {CC Problem List  Visit Diagnosis  ROS  Review (Popup)  Health Maintenance  Quality  BestPractice  Medications  SmartSets  SnapShot Encounters  Media :23}     Bilateral Carpal Tunnel syndrome-S/P right release    She will call when she desires left carpal tunnel release. She will increase her activity as tolerated.  Follow Up {Instructions Charge Capture  Follow-up Communications :23}   No follow-ups on file.

## 2021-09-10 ENCOUNTER — HOSPITAL ENCOUNTER (OUTPATIENT)
Dept: GENERAL RADIOLOGY | Facility: HOSPITAL | Age: 46
Discharge: HOME | End: 2021-09-10
Admitting: NURSE PRACTITIONER

## 2021-09-10 ENCOUNTER — OFFICE VISIT (OUTPATIENT)
Dept: FAMILY MEDICINE CLINIC | Age: 46
End: 2021-09-10

## 2021-09-10 VITALS
SYSTOLIC BLOOD PRESSURE: 135 MMHG | HEIGHT: 67 IN | WEIGHT: 185 LBS | BODY MASS INDEX: 29.03 KG/M2 | HEART RATE: 70 BPM | DIASTOLIC BLOOD PRESSURE: 68 MMHG

## 2021-09-10 DIAGNOSIS — M79.671 RIGHT FOOT PAIN: Primary | ICD-10-CM

## 2021-09-10 DIAGNOSIS — M79.671 RIGHT FOOT PAIN: ICD-10-CM

## 2021-09-10 DIAGNOSIS — S92.354A CLOSED NONDISPLACED FRACTURE OF FIFTH METATARSAL BONE OF RIGHT FOOT, INITIAL ENCOUNTER: ICD-10-CM

## 2021-09-10 PROCEDURE — 99213 OFFICE O/P EST LOW 20 MIN: CPT | Performed by: NURSE PRACTITIONER

## 2021-09-10 PROCEDURE — 73630 X-RAY EXAM OF FOOT: CPT

## 2021-09-10 NOTE — PROGRESS NOTES
"Chief Complaint  Pain (R foot X 1 month - started after tripping )    Subjective          Destiney Thomas presents to [facility] for right foot pain. She was walking in garage approximately 1 month ago and hit foot against a tool and fell. She states she is still experiencing pain. It becomes swollen and more painful as the day progresses.           Objective   Vital Signs:   /68 (BP Location: Left arm, Patient Position: Sitting)   Pulse 70   Ht 170.2 cm (67\")   Wt 83.9 kg (185 lb)   BMI 28.98 kg/m²     Physical Exam  Vitals reviewed.   Constitutional:       Appearance: Normal appearance. She is well-developed.   HENT:      Head: Normocephalic and atraumatic.   Eyes:      Conjunctiva/sclera: Conjunctivae normal.      Pupils: Pupils are equal, round, and reactive to light.   Cardiovascular:      Rate and Rhythm: Normal rate.      Heart sounds: No murmur heard.     Pulmonary:      Effort: Pulmonary effort is normal. No respiratory distress.   Musculoskeletal:      Cervical back: Full passive range of motion without pain.      Right lower leg: Edema present.      Left lower leg: No edema.      Comments: Mild tenderness noted to right lateral mid foot   Skin:     General: Skin is warm and dry.   Neurological:      Mental Status: She is alert and oriented to person, place, and time.   Psychiatric:         Mood and Affect: Mood and affect normal.         Behavior: Behavior normal.         Thought Content: Thought content normal.         Judgment: Judgment normal.          Result Review :              Assessment and Plan    Diagnoses and all orders for this visit:    1. Right foot pain (Primary)  -     XR Foot 3+ View Right; Future       Will notify patient of x-ray results.  Will send to Ortho if needed.  Tylenol/ibuprofen for discomfort.  Patient to notify office with any acute concerns or issues.  Patient verbalizes understanding, agrees with plan of care and has no further " questions upon discharge.      Please note that portions of this note were completed with a voice recognition program.          Follow Up    Return if symptoms worsen or fail to improve.  Patient was given instructions and counseling regarding her condition or for health maintenance advice. Please see specific information pulled into the AVS if appropriate.

## 2021-09-13 ENCOUNTER — TELEPHONE (OUTPATIENT)
Dept: FAMILY MEDICINE CLINIC | Age: 46
End: 2021-09-13

## 2021-09-13 ENCOUNTER — TELEPHONE (OUTPATIENT)
Dept: ORTHOPEDIC SURGERY | Facility: CLINIC | Age: 46
End: 2021-09-13

## 2021-09-14 ENCOUNTER — TELEPHONE (OUTPATIENT)
Dept: FAMILY MEDICINE CLINIC | Age: 46
End: 2021-09-14

## 2021-09-14 DIAGNOSIS — Z12.31 ENCOUNTER FOR SCREENING MAMMOGRAM FOR MALIGNANT NEOPLASM OF BREAST: Primary | ICD-10-CM

## 2021-09-16 ENCOUNTER — HOSPITAL ENCOUNTER (OUTPATIENT)
Dept: MAMMOGRAPHY | Facility: HOSPITAL | Age: 46
Discharge: HOME | End: 2021-09-16
Admitting: NURSE PRACTITIONER

## 2021-09-16 DIAGNOSIS — Z12.31 ENCOUNTER FOR SCREENING MAMMOGRAM FOR MALIGNANT NEOPLASM OF BREAST: ICD-10-CM

## 2021-09-16 PROCEDURE — 77067 SCR MAMMO BI INCL CAD: CPT

## 2021-09-16 PROCEDURE — 77063 BREAST TOMOSYNTHESIS BI: CPT

## 2021-09-17 ENCOUNTER — OFFICE VISIT (OUTPATIENT)
Dept: ORTHOPEDIC SURGERY | Facility: CLINIC | Age: 46
End: 2021-09-17

## 2021-09-17 VITALS — BODY MASS INDEX: 29.03 KG/M2 | HEIGHT: 67 IN | WEIGHT: 185 LBS

## 2021-09-17 DIAGNOSIS — M79.671 RIGHT FOOT PAIN: Primary | ICD-10-CM

## 2021-09-17 DIAGNOSIS — R29.898 DIFFICULTY BEARING WEIGHT ON RIGHT LOWER EXTREMITY: ICD-10-CM

## 2021-09-17 PROCEDURE — 99214 OFFICE O/P EST MOD 30 MIN: CPT | Performed by: PHYSICIAN ASSISTANT

## 2021-09-17 NOTE — PROGRESS NOTES
"Chief Complaint  Establish Care of the Right Foot    Subjective    History of Present Illness      Destiney Thomas is a 46 y.o. female who presents to [facility] for complaint of right foot pain.  She states the pain has been present for approximately 2 weeks after she was walking in the backyard and suddenly felt a sharp and stabbing pain at the lateral aspect of the right foot.  She reports constant pain since that time.  She also reports an injury approximately 6 or 7 weeks prior where she ran into a car jadiel with her right foot in the middle of the night while it was dark.  She denies any pain after that injury until 2 weeks ago.  She works at [employer] although she has been off for the last 7 weeks secondary to a carpal tunnel surgery.  She is scheduled to return to work tomorrow and states she walks 20 to 30 miles per day and is concerned that she will not be able to put weight on the foot.        Objective   Vital Signs:   Ht 170.2 cm (67\")   Wt 83.9 kg (185 lb)   BMI 28.98 kg/m²     Physical Exam  Vitals signs and nursing note reviewed.   Constitutional:       Appearance: Normal appearance.   Pulmonary:      Effort: Pulmonary effort is normal.   Skin:     General: Skin is warm and dry.      Capillary Refill: Capillary refill takes less than 2 seconds.   Neurological:      General: No focal deficit present.      Mental Status: He is alert and oriented to person, place, and time. Mental status is at baseline.   Psychiatric:         Mood and Affect: Mood normal.         Behavior: Behavior normal.         Thought Content: Thought content normal.         Judgment: Judgment normal.     Ortho Exam   RIGHT foot  Positive for swelling and tenderness with palpation of the foot, particularly along the shaft of the 5th metatarsal extending up to the base.        Mild to moderate swelling of the right foot  Dorsiflexion and plantarflexion are restricted secondary to soft tissue injury and " injury to 5th metatarsal .   The ankle mortise is stable.  There is no evidence of a compartmental syndrome or instability of the midfoot. Lisfranc joint is stable.   Dorsalis pedis and posterior tibial artery pulses are palpable.  Common peroneal nerve function is well preserved.  Unable to adequately observe gait due to the inability to bear any weight on the lower extremity.       Result Review :   Radiologic studies - see below for interpretation  RIGHT foot xrays 3 views were ordered by Ventura Jernigan PA-C. Performed at [facility] on 9/17/21. Images were independently viewed and interpreted by myself, my impression as follows:  Findings: Possible prior injury to the very base of the fifth metatarsal tarsal where it appears as an avulsion is seen, concern for healing fracture at the base of the fifth metatarsal approximately zone 1-2  Bony lesion: no  Soft tissues: increased  Joint spaces: within normal limits  Hardware appropriately positioned: not applicable  Prior studies available for comparison: no         Assessment   Assessment and Plan    Problem List Items Addressed This Visit        Musculoskeletal and Injuries    Right foot pain - Primary    Relevant Orders    CT foot right wo contrast       Other    Difficulty bearing weight on right lower extremity    Relevant Orders    CT foot right wo contrast          Follow Up   · Discussion of any imaging in detail. Discussion of orthopaedic goals.  · Risk, benefits, and merits of treatment alternatives reviewed with the patient. Treatment alternatives include: further imaging/testing and bracing.  · Ice, heat, and/or modalities as beneficial  · Patient is encouraged to call or return for any issues or concerns.  · Short leg ankle immobilizer/cam walking boot NWB  · Follow-up once CT scan has been completed.  • Patient was given instructions and counseling regarding her condition or for health maintenance advice. Please see specific  information pulled into the AVS if appropriate.     Ventura Jernigan PA-C   Date of Encounter: 9/17/2021   Electronically signed by Ventura Jernigan PA-C, 09/17/21, 11:59 AM EDT.     EMR Dragon/Transcription disclaimer:  Much of this encounter note is an electronic transcription/translation of spoken language to printed text. The electronic translation of spoken language may permit erroneous, or at times, nonsensical words or phrases to be inadvertently transcribed; Although I have reviewed the note for such errors, some may still exist.

## 2021-09-20 ENCOUNTER — HOSPITAL ENCOUNTER (EMERGENCY)
Dept: HOSPITAL 49 - FER | Age: 46
Discharge: HOME | End: 2021-09-20
Payer: COMMERCIAL

## 2021-09-20 ENCOUNTER — OFFICE VISIT (OUTPATIENT)
Dept: FAMILY MEDICINE CLINIC | Age: 46
End: 2021-09-20

## 2021-09-20 ENCOUNTER — HOSPITAL ENCOUNTER (OUTPATIENT)
Dept: GENERAL RADIOLOGY | Facility: HOSPITAL | Age: 46
Discharge: HOME | End: 2021-09-20
Admitting: NURSE PRACTITIONER

## 2021-09-20 VITALS
TEMPERATURE: 98.7 F | SYSTOLIC BLOOD PRESSURE: 132 MMHG | DIASTOLIC BLOOD PRESSURE: 70 MMHG | BODY MASS INDEX: 29.79 KG/M2 | HEIGHT: 67 IN | HEART RATE: 82 BPM | WEIGHT: 189.8 LBS
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
## 2021-09-20 DIAGNOSIS — M25.572 ACUTE LEFT ANKLE PAIN: ICD-10-CM

## 2021-09-20 DIAGNOSIS — F17.210: ICD-10-CM

## 2021-09-20 DIAGNOSIS — M25.572 ACUTE LEFT ANKLE PAIN: Primary | ICD-10-CM

## 2021-09-20 DIAGNOSIS — S93.402A: Primary | ICD-10-CM

## 2021-09-20 PROCEDURE — 99213 OFFICE O/P EST LOW 20 MIN: CPT | Performed by: NURSE PRACTITIONER

## 2021-09-20 PROCEDURE — 73610 X-RAY EXAM OF ANKLE: CPT

## 2021-09-20 RX ORDER — TRAMADOL HYDROCHLORIDE 50 MG/1
50 TABLET ORAL EVERY 8 HOURS PRN
Qty: 15 TABLET | Refills: 0 | Status: SHIPPED | OUTPATIENT
Start: 2021-09-20 | End: 2023-01-20

## 2021-09-20 NOTE — PROGRESS NOTES
"Chief Complaint  Foot Pain (left foot pain)    Subjective          Destiney Thomas presents to [facility] for left ankle pain. She has walking boot on right foot for fx. She has a CT scan scheduled for that foot. She was walking in back yard and twisted left foot. She states that it is very tender.           Objective   Vital Signs:   /70 (BP Location: Left arm, Patient Position: Sitting, Cuff Size: Large Adult)   Pulse 82   Temp 98.7 °F (37.1 °C) (Oral)   Ht 170.2 cm (67.01\")   Wt 86.1 kg (189 lb 12.8 oz)   BMI 29.72 kg/m²     Physical Exam  Vitals reviewed.   Constitutional:       General: She is not in acute distress.     Appearance: Normal appearance. She is well-developed. She is not ill-appearing or toxic-appearing.   HENT:      Head: Normocephalic and atraumatic.   Eyes:      Conjunctiva/sclera: Conjunctivae normal.      Pupils: Pupils are equal, round, and reactive to light.   Pulmonary:      Effort: Pulmonary effort is normal. No respiratory distress.   Musculoskeletal:      Cervical back: Full passive range of motion without pain.      Left lower leg: Edema present.      Comments: In walking boot (right foot). Left ankle- edema and mild bruising noted. Tenderness noted to lateral aspect.    Skin:     General: Skin is warm and dry.   Neurological:      Mental Status: She is alert and oriented to person, place, and time.   Psychiatric:         Mood and Affect: Mood and affect normal.         Behavior: Behavior normal.         Thought Content: Thought content normal.         Judgment: Judgment normal.          Result Review :              Assessment and Plan    Diagnoses and all orders for this visit:    1. Acute left ankle pain (Primary)  -     XR Ankle 3+ View Left; Future  -     traMADol (ULTRAM) 50 MG tablet; Take 1 tablet by mouth Every 8 (Eight) Hours As Needed for Moderate Pain .  Dispense: 15 tablet; Refill: 0    Will notify pt of xray results. Ace wrap, " elevate, ice and ibuprofen for symptoms. Tramadol to be used sparingly and as needed.  Patient to notify office with any acute concerns or issues.  Patient verbalizes understanding, agrees with plan of care and has no further questions upon discharge.    Please note that portions of this note were completed with a voice recognition program.    Follow Up    No follow-ups on file.  Patient was given instructions and counseling regarding her condition or for health maintenance advice. Please see specific information pulled into the AVS if appropriate.

## 2021-09-20 NOTE — PROGRESS NOTES
Please let patient know that x-ray is negative for fracture.  It is likely just sprained.  Elevate, ice 20 minutes at a time at least 4 times daily.  Continue to use ibuprofen.

## 2021-09-21 ENCOUNTER — TELEPHONE (OUTPATIENT)
Dept: ORTHOPEDIC SURGERY | Facility: CLINIC | Age: 46
End: 2021-09-21

## 2021-09-21 DIAGNOSIS — M79.671 RIGHT FOOT PAIN: Primary | ICD-10-CM

## 2021-09-21 NOTE — TELEPHONE ENCOUNTER
SHARON CALLED FROM BILLING/PRE-CERTS AND [facility] IS WANTING TO KNOW WHY YOU ARE ORDERING CT SCAN AS OPPOSED TO MRI AND THEY WANT TO DO A [id number]. PLEASE CALL [phone]/RJ

## 2021-09-22 NOTE — TELEPHONE ENCOUNTER
Physician reviewer advised they could approve an MRI if that is what I preferred. I advised I actually ordered a CT because it is generally faster and easier for approval and scheduling but if they would approve an MRI I am not opposed to do so. She advised she would change it to MRI and give authorization for that test. MRI right foot without contrast.  Auth # [id number]  Valid # 11/18/21

## 2021-09-22 NOTE — TELEPHONE ENCOUNTER
"I canceled the CT and put an order in for MRI and put the status as \"approved-pending scheduling\".  I entered the auth number and expiration date but unsure who I need to send to.  "

## 2021-09-23 ENCOUNTER — APPOINTMENT (OUTPATIENT)
Dept: CT IMAGING | Facility: HOSPITAL | Age: 46
End: 2021-09-23

## 2021-09-23 ENCOUNTER — OFFICE VISIT (OUTPATIENT)
Dept: NEUROLOGY | Facility: CLINIC | Age: 46
End: 2021-09-23

## 2021-09-23 VITALS
BODY MASS INDEX: 29.82 KG/M2 | SYSTOLIC BLOOD PRESSURE: 140 MMHG | DIASTOLIC BLOOD PRESSURE: 72 MMHG | WEIGHT: 190 LBS | HEIGHT: 67 IN | HEART RATE: 84 BPM

## 2021-09-23 DIAGNOSIS — G43.019 INTRACTABLE MIGRAINE WITHOUT AURA AND WITHOUT STATUS MIGRAINOSUS: Primary | ICD-10-CM

## 2021-09-23 PROCEDURE — 99214 OFFICE O/P EST MOD 30 MIN: CPT | Performed by: NURSE PRACTITIONER

## 2021-09-23 RX ORDER — RIZATRIPTAN BENZOATE 10 MG/1
10 TABLET, ORALLY DISINTEGRATING ORAL ONCE AS NEEDED
Qty: 12 TABLET | Refills: 5 | Status: SHIPPED | OUTPATIENT
Start: 2021-09-23 | End: 2022-01-24 | Stop reason: SDUPTHER

## 2021-09-23 RX ORDER — TIZANIDINE 4 MG/1
4 TABLET ORAL NIGHTLY PRN
Qty: 30 TABLET | Refills: 3 | Status: SHIPPED | OUTPATIENT
Start: 2021-09-23 | End: 2022-01-24 | Stop reason: SDUPTHER

## 2021-09-23 NOTE — PROGRESS NOTES
"Chief Complaint  Migraine    Subjective          Destiney Thomas presents to [facility]  States that headaches have improved since starting tizanidine.  Has only had to take the maxalt 4 times.  It is effective in abortive therapy.  Muscle spasms in the neck have improved with tizanidine.  Denies side effects. States that maxalt is effective in abortive therapy but she always has to take the second dose.  Does not experience side effects.       Objective   Vital Signs:   /72   Pulse 84   Ht 170.2 cm (67\")   Wt 86.2 kg (190 lb)   BMI 29.76 kg/m²     Physical Exam  HENT:      Head: Normocephalic.   Pulmonary:      Effort: Pulmonary effort is normal.   Neurological:      Mental Status: She is alert and oriented to person, place, and time.      Cranial Nerves: Cranial nerves are intact.      Sensory: Sensation is intact.      Motor: Motor function is intact.      Coordination: Coordination is intact.      Deep Tendon Reflexes: Reflexes are normal and symmetric.        Neurologic Exam     Mental Status   Oriented to person, place, and time.        Result Review :               Assessment and Plan    Diagnoses and all orders for this visit:    1. Intractable migraine without aura and without status migrainosus (Primary)  Assessment & Plan:   Continue tizanidine for preventative therapy of headache.  Will increase Maxalt to 10mg to help eliminate the need for a second dose.           Other orders  -     rizatriptan MLT (MAXALT-MLT) 10 MG disintegrating tablet; Place 1 tablet on the tongue 1 (One) Time As Needed for Migraine for up to 30 days. May repeat in 2 hours if needed  Dispense: 12 tablet; Refill: 5  -     tiZANidine (ZANAFLEX) 4 MG tablet; Take 1 tablet by mouth At Night As Needed for Muscle Spasms.  Dispense: 30 tablet; Refill: 3      Follow Up   Return in about 4 months (around 1/23/2022) for Migraine f/u.  Patient was given instructions and counseling regarding " her condition or for health maintenance advice. Please see specific information pulled into the AVS if appropriate.

## 2021-09-24 ENCOUNTER — OFFICE VISIT (OUTPATIENT)
Dept: ORTHOPEDIC SURGERY | Facility: CLINIC | Age: 46
End: 2021-09-24

## 2021-09-24 ENCOUNTER — HOSPITAL ENCOUNTER (OUTPATIENT)
Dept: GENERAL RADIOLOGY | Facility: HOSPITAL | Age: 46
Discharge: HOME | End: 2021-09-24
Admitting: PHYSICIAN ASSISTANT

## 2021-09-24 ENCOUNTER — APPOINTMENT (OUTPATIENT)
Dept: MRI IMAGING | Facility: HOSPITAL | Age: 46
End: 2021-09-24

## 2021-09-24 VITALS — TEMPERATURE: 97.3 F | WEIGHT: 190 LBS | HEIGHT: 67 IN | BODY MASS INDEX: 29.82 KG/M2

## 2021-09-24 DIAGNOSIS — M25.572 ACUTE LEFT ANKLE PAIN: Primary | ICD-10-CM

## 2021-09-24 DIAGNOSIS — M79.672 FOOT PAIN, LEFT: ICD-10-CM

## 2021-09-24 PROCEDURE — 73630 X-RAY EXAM OF FOOT: CPT

## 2021-09-24 PROCEDURE — 99214 OFFICE O/P EST MOD 30 MIN: CPT | Performed by: PHYSICIAN ASSISTANT

## 2021-09-24 RX ORDER — HYDROCODONE BITARTRATE AND ACETAMINOPHEN 7.5; 325 MG/1; MG/1
TABLET ORAL
COMMUNITY
Start: 2021-09-21 | End: 2022-07-28

## 2021-09-24 NOTE — ASSESSMENT & PLAN NOTE
Continue tizanidine for preventative therapy of headache.  Will increase Maxalt to 10mg to help eliminate the need for a second dose.

## 2021-09-24 NOTE — PROGRESS NOTES
"Chief Complaint  Pain of the Left Ankle and Establish Care    Subjective    History of Present Illness      Destiney Thomas is a 46 y.o. female who presents to [facility] for new complaint of Ankle Pain: Patient complains of left ankle pain.  Onset of the symptoms was several days ago. Inciting event: she was walking across the yard from her strawberry patch and felt pain in her foot and ankle. Current symptoms include inability to bear weight and pain at the lateral aspect of the ankle and foot.  Aggravating symptoms: any weight bearing. Patient has had no prior ankle problems.         Objective   Vital Signs:   Temp 97.3 °F (36.3 °C)   Ht 170.2 cm (67.01\")   Wt 86.2 kg (190 lb)   BMI 29.75 kg/m²     Physical Exam  Vitals signs and nursing note reviewed.   Constitutional:       Appearance: Normal appearance.   Pulmonary:      Effort: Pulmonary effort is normal.   Skin:     General: Skin is warm and dry.      Capillary Refill: Capillary refill takes less than 2 seconds.   Neurological:      General: No focal deficit present.      Mental Status: He is alert and oriented to person, place, and time. Mental status is at baseline.   Psychiatric:         Mood and Affect: Mood normal.         Behavior: Behavior normal.         Thought Content: Thought content normal.         Judgment: Judgment normal.     Ortho Exam   LEFT ankle/foot  Positive for diffuse but ill-defined tenderness of the affected ankle.   Positive for swelling and tenderness over the lateral aspect of the ankle and foot.  Distal tibiofibular syndesmotic ligament appears intact.  Inversion and eversion against resistance are painful for the patient.   Negative external rotation and squeeze tests.   Dorsalis pedis and posterior tibial artery pulses are palpable.   Common peroneal nerve function is well preserved.   There is no evidence of a proximal fibular injury.        Result Review :   Radiologic studies - see below for " interpretation  LEFT ankle xrays 3 views were performed at [facility] on 9/20/21. Images were independently viewed and interpreted by myself, my impression as follows:  No acute bony abnormality noted    LEFT foot xrays 3 views were ordered by Ventura Jernigan PA-C. Performed at [facility] on 9/24/21. Images were independently viewed and interpreted by myself, my impression as follows:   Findings: Soft tissue swelling noted at the lateral aspect of the ankle, no acute abnormality noted  Bony lesion: no  Soft tissues: increased  Joint spaces: within normal limits  Hardware appropriately positioned: not applicable  Prior studies available for comparison: no         Assessment   Assessment and Plan    Problem List Items Addressed This Visit        Musculoskeletal and Injuries    Acute left ankle pain - Primary      Other Visit Diagnoses     Foot pain, left        Relevant Orders    XR Foot 3+ View Left (Completed)          Follow Up   · Discussion of any imaging in detail. Discussion of orthopaedic goals.  · Risk, benefits, and merits of treatment alternatives reviewed with the patient. Treatment alternatives include: oral anti-inflammatories/NSAID, bracing, physical therapy and further imaging/testing  · Ice, heat, and/or modalities as beneficial  · Patient is encouraged to call or return for any issues or concerns.  · Short leg ankle immobilizer/cam walking boot--our office was out of stock of short cam boots and therefore patient received a tall cam boot. Discussed trying an ankle stabilizing brace.   · Follow-up will be determined once we get further imaging back on the opposite ankle and foot.  • Patient was given instructions and counseling regarding her condition or for health maintenance advice. Please see specific information pulled into the AVS if appropriate.     Ventura Jernigan PA-C   Date of Encounter: 9/24/2021   Electronically signed by Ventura Borden  ARSENIO Jernigan, 09/24/21, 5:59 AM EDT.     DELFINO Dragon/Transcription disclaimer:  Much of this encounter note is an electronic transcription/translation of spoken language to printed text. The electronic translation of spoken language may permit erroneous, or at times, nonsensical words or phrases to be inadvertently transcribed; Although I have reviewed the note for such errors, some may still exist.

## 2021-09-28 ENCOUNTER — HOSPITAL ENCOUNTER (OUTPATIENT)
Dept: MRI IMAGING | Facility: HOSPITAL | Age: 46
Discharge: HOME | End: 2021-09-28
Admitting: PHYSICIAN ASSISTANT

## 2021-09-28 DIAGNOSIS — M79.671 RIGHT FOOT PAIN: ICD-10-CM

## 2021-09-28 PROCEDURE — 73718 MRI LOWER EXTREMITY W/O DYE: CPT

## 2021-10-05 ENCOUNTER — TELEPHONE (OUTPATIENT)
Dept: ORTHOPEDIC SURGERY | Facility: CLINIC | Age: 46
End: 2021-10-05

## 2021-10-05 NOTE — TELEPHONE ENCOUNTER
Patient was seen last week and had an MRI.  Do you want to schedule an appt for her to be seen for a follow up or do a telephone visit?

## 2021-10-06 ENCOUNTER — TELEPHONE (OUTPATIENT)
Dept: ORTHOPEDIC SURGERY | Facility: CLINIC | Age: 46
End: 2021-10-06

## 2021-10-06 NOTE — TELEPHONE ENCOUNTER
Patient called and asking about her [facility] papers.  She states her work keeps asking her about them.

## 2021-10-11 NOTE — TELEPHONE ENCOUNTER
I TRIED TO CALL PATIENT BACK REGARDING FMLA PAPERWORK BUT THE MESSAGE STATES THAT SHE IS NOT ACCEPTING CALLS AT THIS TIME.  PER GARRY SHE WANTED ME TO CALL PATIENT BEFORE FILLING OUT FMLA PAPERWORK TO SEE WHAT IT IS THE PATIENT IS REQUESTING AS HER DX. DOES NOT SHOW ANY TYPE OF FRACTURE/RJ

## 2021-10-13 ENCOUNTER — TELEPHONE (OUTPATIENT)
Dept: ORTHOPEDIC SURGERY | Facility: CLINIC | Age: 46
End: 2021-10-13

## 2021-10-13 NOTE — TELEPHONE ENCOUNTER
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    FAX: [phone]

## 2022-01-03 RX ORDER — TIZANIDINE 4 MG/1
4 TABLET ORAL NIGHTLY PRN
Qty: 30 TABLET | Refills: 3 | OUTPATIENT
Start: 2022-01-03

## 2022-01-03 NOTE — TELEPHONE ENCOUNTER
30 day supply +3 refills sent in on 09/23/21 so she should have enough to last until her f/u on 01/24/2022. I called and confirmed with the pharmacy that she did have refills left and they said they would get it ready for her. I called and notified the pt and she demonstrated understanding by restatement. I asked that she call with further questions or concerns and she said she would.

## 2022-01-03 NOTE — TELEPHONE ENCOUNTER
Caller:  TRISH    Relationship:     Best call back number: [phone]    Requested Prescriptions:   Requested Prescriptions     Pending Prescriptions Disp Refills   • tiZANidine (ZANAFLEX) 4 MG tablet 30 tablet 3     Sig: Take 1 tablet by mouth At Night As Needed for Muscle Spasms.        Pharmacy where request should be sent:    [facility] IN [place]    Additional details provided by patient:   RAN OUT OF ZANAFLEX 2 WEEKS AGO      Does the patient have less than a 3 day supply:  [x] Yes  [] No    Gladys Mckeon Rep   01/03/22 11:48 EST

## 2022-01-24 ENCOUNTER — OFFICE VISIT (OUTPATIENT)
Dept: NEUROLOGY | Facility: CLINIC | Age: 47
End: 2022-01-24

## 2022-01-24 VITALS
BODY MASS INDEX: 29.6 KG/M2 | WEIGHT: 184.2 LBS | SYSTOLIC BLOOD PRESSURE: 140 MMHG | DIASTOLIC BLOOD PRESSURE: 65 MMHG | HEIGHT: 66 IN | HEART RATE: 76 BPM

## 2022-01-24 DIAGNOSIS — G43.019 INTRACTABLE MIGRAINE WITHOUT AURA AND WITHOUT STATUS MIGRAINOSUS: Primary | ICD-10-CM

## 2022-01-24 PROCEDURE — 99213 OFFICE O/P EST LOW 20 MIN: CPT | Performed by: NURSE PRACTITIONER

## 2022-01-24 RX ORDER — TIZANIDINE 4 MG/1
4 TABLET ORAL NIGHTLY PRN
Qty: 30 TABLET | Refills: 3 | Status: SHIPPED | OUTPATIENT
Start: 2022-01-24 | End: 2022-07-28 | Stop reason: SDUPTHER

## 2022-01-24 RX ORDER — RIZATRIPTAN BENZOATE 10 MG/1
10 TABLET, ORALLY DISINTEGRATING ORAL ONCE AS NEEDED
Qty: 12 TABLET | Refills: 5 | Status: SHIPPED | OUTPATIENT
Start: 2022-01-24 | End: 2022-07-28 | Stop reason: SDUPTHER

## 2022-01-24 NOTE — ASSESSMENT & PLAN NOTE
Continue tizanidine and maxalt.  Discussed that increase in headaches is likely secondary to dental work.  If it does not improve after healed from dental work, she will let me know.

## 2022-01-24 NOTE — PROGRESS NOTES
"Chief Complaint  No chief complaint on file.    Subjective          Destiney Thomas presents to [facility]  Was doing well from a headache standpoint on tizanidine.  However, had all her top teeth pulled on 1/6/22 and has had almost daily headache after that time.  Feels as if those headaches are starting to improve some.  Following up with dentist.  Maxalt is effective abortive therapy.       Objective   Vital Signs:   /65   Pulse 76   Ht 167.6 cm (66\")   Wt 83.6 kg (184 lb 3.2 oz)   BMI 29.73 kg/m²     Physical Exam  HENT:      Head: Normocephalic.   Pulmonary:      Effort: Pulmonary effort is normal.   Neurological:      Mental Status: She is alert and oriented to person, place, and time.      Cranial Nerves: Cranial nerves are intact.      Sensory: Sensation is intact.      Motor: Motor function is intact.      Coordination: Coordination is intact.      Deep Tendon Reflexes: Reflexes are normal and symmetric.        Neurologic Exam     Mental Status   Oriented to person, place, and time.        Result Review :               Assessment and Plan    Diagnoses and all orders for this visit:    1. Intractable migraine without aura and without status migrainosus (Primary)  Assessment & Plan:  Continue tizanidine and maxalt.  Discussed that increase in headaches is likely secondary to dental work.  If it does not improve after healed from dental work, she will let me know.           Other orders  -     tiZANidine (ZANAFLEX) 4 MG tablet; Take 1 tablet by mouth At Night As Needed for Muscle Spasms.  Dispense: 30 tablet; Refill: 3  -     rizatriptan MLT (MAXALT-MLT) 10 MG disintegrating tablet; Place 1 tablet on the tongue 1 (One) Time As Needed for Migraine for up to 30 days. May repeat in 2 hours if needed  Dispense: 12 tablet; Refill: 5      Follow Up   Return in about 4 months (around 5/24/2022) for Migraine f/u.  Patient was given instructions and counseling " regarding her condition or for health maintenance advice. Please see specific information pulled into the AVS if appropriate.

## 2022-05-24 ENCOUNTER — TELEPHONE (OUTPATIENT)
Dept: NEUROLOGY | Facility: CLINIC | Age: 47
End: 2022-05-24

## 2022-05-24 NOTE — TELEPHONE ENCOUNTER
Caller: Destniey Thomas    Relationship: Self    Best call back number: [phone]    What was the call regarding: PT IS REQUESTING HER F/U VISIT W/ MILA MORALES ON 7/28/22 BE SWITCHED TO A [employer] VIDEO VISIT INSTEAD OF AN IN-OFFICE VISIT. I HAVE SENT PT AN UPDATED ACTIVATION CODE TO SET UP [employer] PORTAL.    HUB UNABLE TO CHANGE TO [employer] VISIT TYPE AS REQUIRE APPROVAL FROM PROVIDER.    Do you require a callback: YES, PLEASE CONTACT PT TO CONFIRM VISIT TYPE.    PLEASE REVIEW AND ADVISE.

## 2022-07-28 ENCOUNTER — OFFICE VISIT (OUTPATIENT)
Dept: NEUROLOGY | Facility: CLINIC | Age: 47
End: 2022-07-28

## 2022-07-28 VITALS
HEART RATE: 84 BPM | WEIGHT: 178 LBS | SYSTOLIC BLOOD PRESSURE: 113 MMHG | BODY MASS INDEX: 28.61 KG/M2 | DIASTOLIC BLOOD PRESSURE: 70 MMHG | HEIGHT: 66 IN

## 2022-07-28 DIAGNOSIS — G43.019 INTRACTABLE MIGRAINE WITHOUT AURA AND WITHOUT STATUS MIGRAINOSUS: Primary | ICD-10-CM

## 2022-07-28 DIAGNOSIS — M62.838 MUSCLE SPASMS OF NECK: ICD-10-CM

## 2022-07-28 PROCEDURE — 99214 OFFICE O/P EST MOD 30 MIN: CPT | Performed by: NURSE PRACTITIONER

## 2022-07-28 RX ORDER — TIZANIDINE 4 MG/1
4 TABLET ORAL NIGHTLY PRN
Qty: 30 TABLET | Refills: 3 | Status: SHIPPED | OUTPATIENT
Start: 2022-07-28 | End: 2022-11-28 | Stop reason: SDUPTHER

## 2022-07-28 RX ORDER — RIZATRIPTAN BENZOATE 10 MG/1
10 TABLET, ORALLY DISINTEGRATING ORAL ONCE AS NEEDED
Qty: 12 TABLET | Refills: 5 | Status: SHIPPED | OUTPATIENT
Start: 2022-07-28 | End: 2022-11-28 | Stop reason: SDUPTHER

## 2022-07-28 NOTE — PROGRESS NOTES
"Chief Complaint  Migraine    Subjective          Destiney Thomas presents to [facility]  Headaches remain managed on tizanidine and maxalt.  Having 1-2 headaches per month.  States she's continuing to have significant muscle spasms to neck.        Objective   Vital Signs:   /70   Pulse 84   Ht 167.6 cm (66\")   Wt 80.7 kg (178 lb)   BMI 28.73 kg/m²     Physical Exam  HENT:      Head: Normocephalic.   Pulmonary:      Effort: Pulmonary effort is normal.   Musculoskeletal:      Cervical back: Muscular tenderness present. Decreased range of motion.   Neurological:      Mental Status: She is alert and oriented to person, place, and time.      Cranial Nerves: Cranial nerves are intact.      Sensory: Sensation is intact.      Motor: Motor function is intact.      Coordination: Coordination is intact.      Deep Tendon Reflexes: Reflexes are normal and symmetric.        Neurologic Exam     Mental Status   Oriented to person, place, and time.        Result Review :               Assessment and Plan    Diagnoses and all orders for this visit:    1. Intractable migraine without aura and without status migrainosus (Primary)  Assessment & Plan:  Continue tizanidine preventatively and Maxalt PRN.         2. Muscle spasms of neck  Assessment & Plan:  Discussed that I feel she would benefit from trigger point injections in bilateral cervical paraspinal and trapezius muscles.  She states she doesn't care for needles and would like to hold off for now.       Other orders  -     tiZANidine (ZANAFLEX) 4 MG tablet; Take 1 tablet by mouth At Night As Needed for Muscle Spasms.  Dispense: 30 tablet; Refill: 3  -     rizatriptan MLT (MAXALT-MLT) 10 MG disintegrating tablet; Place 1 tablet on the tongue 1 (One) Time As Needed for Migraine for up to 30 days. May repeat in 2 hours if needed  Dispense: 12 tablet; Refill: 5      Follow Up   Return in about 4 months (around 11/28/2022) for " Migraine f/u.  Patient was given instructions and counseling regarding her condition or for health maintenance advice. Please see specific information pulled into the AVS if appropriate.

## 2022-07-28 NOTE — ASSESSMENT & PLAN NOTE
Discussed that I feel she would benefit from trigger point injections in bilateral cervical paraspinal and trapezius muscles.  She states she doesn't care for needles and would like to hold off for now.

## 2022-11-28 ENCOUNTER — OFFICE VISIT (OUTPATIENT)
Dept: NEUROLOGY | Facility: CLINIC | Age: 47
End: 2022-11-28

## 2022-11-28 VITALS
BODY MASS INDEX: 32.32 KG/M2 | SYSTOLIC BLOOD PRESSURE: 151 MMHG | HEIGHT: 66 IN | WEIGHT: 201.1 LBS | DIASTOLIC BLOOD PRESSURE: 83 MMHG | HEART RATE: 84 BPM

## 2022-11-28 DIAGNOSIS — G43.019 INTRACTABLE MIGRAINE WITHOUT AURA AND WITHOUT STATUS MIGRAINOSUS: Primary | ICD-10-CM

## 2022-11-28 DIAGNOSIS — M62.838 MUSCLE SPASMS OF NECK: ICD-10-CM

## 2022-11-28 PROCEDURE — 99214 OFFICE O/P EST MOD 30 MIN: CPT | Performed by: NURSE PRACTITIONER

## 2022-11-28 RX ORDER — RIZATRIPTAN BENZOATE 10 MG/1
10 TABLET, ORALLY DISINTEGRATING ORAL ONCE AS NEEDED
Qty: 12 TABLET | Refills: 5 | Status: SHIPPED | OUTPATIENT
Start: 2022-11-28 | End: 2023-03-16

## 2022-11-28 RX ORDER — TIZANIDINE 4 MG/1
4 TABLET ORAL NIGHTLY PRN
Qty: 30 TABLET | Refills: 5 | Status: SHIPPED | OUTPATIENT
Start: 2022-11-28 | End: 2023-02-27

## 2022-12-22 NOTE — PROGRESS NOTES
Destiney Thomas  1975     Office/Outpatient Visit    Visit Date: Thu, May 27, 2021 04:17 pm    Provider: Kalie Choudhary N.P. (Assistant: Shobha Mehta MA)    Location: [facility]        Electronically signed by Kalie Choudhary N.P. on  2021 10:16:20 PM                             Subjective:        CC: Ms. Thomas is a 45 year old White female.  allergic reaction;         HPI: 44 y/o female presenting to office c/o rash to jefe legs for months. States that is worsens after standing for long periods of time at work. Does not itch. It is tender at times.     ROS:     CONSTITUTIONAL:  Negative for fatigue and fever.      EYES:  Negative for blurred vision.      CARDIOVASCULAR:  Negative for chest pain, palpitations, pedal edema and tachycardia.      RESPIRATORY:  Negative for recent cough and dyspnea.      MUSCULOSKELETAL:  Negative for myalgias.      INTEGUMENTARY/BREAST:  Negative for rash.      NEUROLOGICAL:  Negative for dizziness, paresthesias and weakness.      PSYCHIATRIC:  Negative for anxiety, depression, sleep disturbance and suicidal thoughts.          Past Medical History / Family History / Social History:         Last Reviewed on 2021 10:11 PM by Kalie Choudhary    Past Medical History:                 PAST MEDICAL HISTORY         Asthma: moderate severity; had inhaler , and treatment began  13 years ago, last episode in ;     Urinary Tract Infections, Recurrent: has had e coli infections;     Seizure Disorder: did test for seizures , negative, and episode was related to low blood sugar;  syncope dx'd in  did have a headace that lasted at least 24 hours         GYNECOLOGICAL HISTORY:        Problems with menstrual cycles include heavy bleeding.      Contraception: S/P tubal ligation;    Menarche occurred at age 10.    No abnormal Paps    Has never had a mammogram.  Hospitalizations: Never             ADVANCED DIRECTIVES: None          Is the patient due for a refill? Yes    Was the patient seen the past year? Yes    Date of last office visit: 11/3/2022    Does the patient have an upcoming appointment?  Yes   If yes, When? 2/7/2023    Provider to refill:SS    Does the patients insurance require a 100 day supply?  No     PREVENTIVE HEALTH MAINTENANCE             PAP SMEAR: was last done 4/13/16 with normal results         Surgical History:         Positive for    Breast Augmentation - below the muscle; at age [age].  ;     Positive for    Bilateral Tubal Ligation and    uterine ablation;;         Family History:     Father: Hypertension; Hyperlipidemia; Myocardial Infarction;  Transient Ischemic Attack; blocked carotids;  Anxiety     Mother: Hypertension; Hyperlipidemia;  lupus;  Anxiety; Depression; Bipolar Disorder     Brother(s): Healthy; 2 brother(s) total     Paternal Grandfather: Medical history unknown     Paternal Grandmother: Medical history unknown     Maternal Grandfather: Medical history unknown     Maternal Grandmother: Alzheimer's Disease         Social History:     Occupation: [employer]      Marital Status:      Children: 2 children         Tobacco/Alcohol/Supplements:     Last Reviewed on 5/31/2021 10:11 PM by Kalie Choudhary    Tobacco: Current Smoker: She currently smokes every day, 1-1/2 packs per day.          Alcohol: Frequency: Socially         Substance Abuse History:     Last Reviewed on 5/31/2021 10:11 PM by Kalie Choudhary        Marijuana: Current use.          Mental Health History:     Last Reviewed on 5/31/2021 10:11 PM by Kalie Choudhary        Generalized Anxiety Disorder         Communicable Diseases (eg STDs):     Last Reviewed on 5/31/2021 10:11 PM by Kalie Choudhary        Immunizations:     None        Allergies:     Last Reviewed on 5/31/2021 10:11 PM by Kalie Choudhary    No Known Allergies.        Current Medications:     Last Reviewed on 5/31/2021 10:11 PM by Kalie Choudhary    cyclobenzaprine 10 mg oral tablet [TAKE 1 TABLET BY MOUTH EVERY 8 HOURS AS NEEDED FOR MUSCLE SPASM]    ondansetron HCL 4 mg oral tablet [TAKE 1 TABLET BY MOUTH EVERY 4-6 HOURS AS NEEDED FOR NAUSEA AND VOMITING]    fluticasone propionate 50 mcg/actuation Intranasal Spray, Suspension  [inhale 1 spray (50 mcg) in each nostril by intranasal route daily]    efinaconazole 10 % Topical Solution With Applicator [apply to affected toenail(s) by topical route once daily x 48 weeks]        Objective:        Vitals:         Current: 5/27/2021 4:21:30 PM    Ht:  5 ft, 6 in;  Wt: 165.4 lbs;  BMI: 26.7T: 97.3 F (temporal);  BP: 118/80 mm Hg (right arm, sitting);  P: 85 bpm (right arm (BP Cuff), sitting);  sCr: 0.85 mg/dL;  GFR: 86.27        Exams:     PHYSICAL EXAM:     GENERAL: vital signs recorded - well developed, well nourished;  well groomed;  no apparent distress;     EYES: extraocular movements intact; conjunctiva and cornea are normal; PERRLA;     RESPIRATORY: normal respiratory rate and pattern with no distress; normal breath sounds with no rales, rhonchi, wheezes or rubs;     CARDIOVASCULAR: normal rate; rhythm is regular;  no systolic murmur; no edema;     LYMPHATIC: no enlargement of cervical or facial nodes;     BREAST/INTEGUMENT: a rash is noted on the legs;  the color is mainly red;  it is best characterized as macular and papular;     MUSCULOSKELETAL: normal gait; normal overall tone     NEUROLOGIC: mental status: alert and oriented x 3;     PSYCHIATRIC:  appropriate affect and demeanor; normal speech pattern; grossly normal memory;         Assessment:         L95.9   Vasculitis limited to the skin, unspecified       J30.2   Other seasonal allergic rhinitis           ORDERS:         Meds Prescribed:       [New Rx] predniSONE 10 mg oral tablet [Take 6 tablets (60mg) po x 2 days, then 4 tablets (40mg) po x 3 days, then 3 tablets (30mg) po x 3 days, then 2 tablets (20mg) po x 3 days, then 1 tablet (10mg) po x 3 days], #42 (forty two) tablets, Refills: 0 (zero)       [Refilled] fluticasone propionate 50 mcg/actuation Intranasal Spray, Suspension [inhale 1 spray (50 mcg) in each nostril by intranasal route daily], #16 (sixteen) milliliters, Refills: 5 (five)         Procedures Ordered:       REFER   Referral to Specialist or Other Facility  (Send-Out)                      Plan:         Vasculitis limited to the skin, unspecifiedPt to take medication as rx. Sent to referrals. Notify office with any acute concerns or issues. Pt v/u and had no further questions upon d/c.         REFERRALS:  Referral initiated to vascular sx.            Prescriptions:       [New Rx] predniSONE 10 mg oral tablet [Take 6 tablets (60mg) po x 2 days, then 4 tablets (40mg) po x 3 days, then 3 tablets (30mg) po x 3 days, then 2 tablets (20mg) po x 3 days, then 1 tablet (10mg) po x 3 days], #42 (forty two) tablets, Refills: 0 (zero)           Orders:       REFER  Referral to Specialist or Other Facility  (Send-Out)              Other seasonal allergic rhinitispt states flonase helps with allergies and would like a refill.           Prescriptions:       [Refilled] fluticasone propionate 50 mcg/actuation Intranasal Spray, Suspension [inhale 1 spray (50 mcg) in each nostril by intranasal route daily], #16 (sixteen) milliliters, Refills: 5 (five)             Patient Recommendations:        For  Vasculitis limited to the skin, unspecified:    I also recommend vascular sx.              Charge Capture:         Primary Diagnosis:     L95.9  Vasculitis limited to the skin, unspecified           Orders:      [id number]  Office/outpatient visit; established patient, level 3  (In-House)              J30.2  Other seasonal allergic rhinitis

## 2023-01-20 ENCOUNTER — HOSPITAL ENCOUNTER (OUTPATIENT)
Dept: GENERAL RADIOLOGY | Facility: HOSPITAL | Age: 48
Discharge: HOME | End: 2023-01-20
Admitting: NURSE PRACTITIONER
Payer: OTHER GOVERNMENT

## 2023-01-20 ENCOUNTER — OFFICE VISIT (OUTPATIENT)
Dept: FAMILY MEDICINE CLINIC | Age: 48
End: 2023-01-20
Payer: OTHER GOVERNMENT

## 2023-01-20 VITALS
HEART RATE: 87 BPM | SYSTOLIC BLOOD PRESSURE: 118 MMHG | HEIGHT: 66 IN | WEIGHT: 204.4 LBS | DIASTOLIC BLOOD PRESSURE: 80 MMHG | BODY MASS INDEX: 32.85 KG/M2

## 2023-01-20 DIAGNOSIS — R23.2 HOT FLASHES: ICD-10-CM

## 2023-01-20 DIAGNOSIS — M50.30 DEGENERATION OF CERVICAL INTERVERTEBRAL DISC: ICD-10-CM

## 2023-01-20 DIAGNOSIS — M54.2 CHRONIC NECK PAIN: ICD-10-CM

## 2023-01-20 DIAGNOSIS — M25.512 ACUTE PAIN OF LEFT SHOULDER: ICD-10-CM

## 2023-01-20 DIAGNOSIS — M25.512 ACUTE PAIN OF LEFT SHOULDER: Primary | ICD-10-CM

## 2023-01-20 DIAGNOSIS — G89.29 CHRONIC NECK PAIN: ICD-10-CM

## 2023-01-20 DIAGNOSIS — M25.612 DECREASED ROM OF LEFT SHOULDER: ICD-10-CM

## 2023-01-20 PROCEDURE — 99214 OFFICE O/P EST MOD 30 MIN: CPT | Performed by: NURSE PRACTITIONER

## 2023-01-20 PROCEDURE — 73030 X-RAY EXAM OF SHOULDER: CPT

## 2023-01-20 RX ORDER — CYCLOBENZAPRINE HCL 5 MG
5 TABLET ORAL 3 TIMES DAILY PRN
Qty: 30 TABLET | Refills: 0 | Status: SHIPPED | OUTPATIENT
Start: 2023-01-20 | End: 2023-02-24 | Stop reason: SDUPTHER

## 2023-01-20 NOTE — PROGRESS NOTES
"Destiney Thomas presents to [facility] with complaint of  Shoulder Pain ((L)) and Hot Flashes    SUBJECTIVE  Shoulder Injury   The left (started 2 months ago) shoulder is affected. Injury mechanism: lifting her father  The quality of the pain is described as aching. The pain does not radiate. The pain is at a severity of 8/10. Associated symptoms include muscle weakness and numbness. Pertinent negatives include no chest pain or tingling. The symptoms are aggravated by movement, overhead lifting and palpation. She has tried NSAIDs for the symptoms. The treatment provided mild relief.      Hot flashes: has been going on for the past 5 years, maybe longer she says. She is able to tell when hot flashes come on. She has a pressure in her shoulder, neck and back that precedes hot flash. She says when she pops her neck that the hot flashes stop instantly. If she does not pop her neck, she has pain occur in the top of her head and it goes all the way down to her feet, and widespread hotflash happens. She says she has only been able to relieve the hot flash with popping her neck for a month. She says she has never passed out. She denies heart racing or palps. She also has tingling down the left side of her back. She had MRI in 2020 that showed degenerative changes. Was going to pain management.     OBJECTIVE  Vital Signs:   /80   Pulse 87   Ht 167.6 cm (66\")   Wt 92.7 kg (204 lb 6.4 oz)   BMI 32.99 kg/m²       Physical Exam  Vitals reviewed.   Constitutional:       General: She is not in acute distress.     Appearance: Normal appearance. She is not ill-appearing.   HENT:      Head: Normocephalic and atraumatic.      Nose: Nose normal.      Mouth/Throat:      Mouth: Mucous membranes are moist.      Pharynx: Oropharynx is clear.   Cardiovascular:      Rate and Rhythm: Normal rate and regular rhythm.      Pulses: Normal pulses.      Heart sounds: Normal heart sounds.   Pulmonary:      " Effort: Pulmonary effort is normal.      Breath sounds: Normal breath sounds.   Musculoskeletal:      Right shoulder: Normal.      Left shoulder: Tenderness present. No swelling, deformity or laceration. Decreased range of motion. Decreased strength.      Cervical back: Normal, full passive range of motion without pain and neck supple.   Skin:     General: Skin is warm and dry.   Neurological:      General: No focal deficit present.      Mental Status: She is alert and oriented to person, place, and time. Mental status is at baseline.   Psychiatric:         Mood and Affect: Mood normal.         Behavior: Behavior normal.         Judgment: Judgment normal.          Results Review:  The following data was reviewed by Margy Mae, MILA [unfilled] 11:43 EST.  MRI Cervical Spine Without Contrast (09/11/2020 16:00)      ASSESSMENT AND PLAN:  Diagnoses and all orders for this visit:    1. Acute pain of left shoulder (Primary)  -     XR Shoulder 2+ View Left; Future  -     diclofenac (VOLTAREN) 50 MG EC tablet; Take 1 tablet by mouth 2 (Two) Times a Day As Needed (pain).  Dispense: 30 tablet; Refill: 0    2. Chronic neck pain  -     MRI Cervical Spine Without Contrast; Future  -     diclofenac (VOLTAREN) 50 MG EC tablet; Take 1 tablet by mouth 2 (Two) Times a Day As Needed (pain).  Dispense: 30 tablet; Refill: 0  -     cyclobenzaprine (FLEXERIL) 5 MG tablet; Take 1 tablet by mouth 3 (Three) Times a Day As Needed for Muscle Spasms.  Dispense: 30 tablet; Refill: 0    3. Hot flashes      Obtain shoulder xray, she may need shoulder MRI. Offered PT but pt wants to wait on MRI. Voltaren prn for pain. May also use Tylenol, no other NSAIDs when on Voltaren. Her hot flashes that are relieved with neck popping is unusual. Given her chronic neck pain, will see if we can get repeat cervical MRI covered. She was given flexeril as well. She will hold off on using Zanaflex while on flexeril.       Follow Up   No follow-ups on file. Patient  to notify office with any acute concerns or issues.  Patient verbalizes understanding, agrees with plan of care and has no further questions upon discharge.     Patient was given instructions and counseling regarding her condition or for health maintenance advice. Please see specific information pulled into the AVS if appropriate.     Discussed the importance of following up with any needed screening tests/labs/specialist appointments and any requested follow-up recommended by me today. Importance of maintaining follow-up discussed and patient accepts that missed appointments can delay diagnosis and potentially lead to worsening of conditions.    Part of this note may be an electronic transcription/translation of spoken language to printed text using the Dragon Dictation System.

## 2023-02-10 ENCOUNTER — HOSPITAL ENCOUNTER (OUTPATIENT)
Dept: MRI IMAGING | Facility: HOSPITAL | Age: 48
Discharge: HOME | End: 2023-02-10
Payer: OTHER GOVERNMENT

## 2023-02-10 DIAGNOSIS — M25.512 ACUTE PAIN OF LEFT SHOULDER: ICD-10-CM

## 2023-02-10 DIAGNOSIS — M25.612 DECREASED ROM OF LEFT SHOULDER: ICD-10-CM

## 2023-02-10 DIAGNOSIS — G89.29 CHRONIC NECK PAIN: ICD-10-CM

## 2023-02-10 DIAGNOSIS — M54.2 CHRONIC NECK PAIN: ICD-10-CM

## 2023-02-10 PROCEDURE — 72141 MRI NECK SPINE W/O DYE: CPT

## 2023-02-10 PROCEDURE — 73221 MRI JOINT UPR EXTREM W/O DYE: CPT

## 2023-02-13 ENCOUNTER — TELEPHONE (OUTPATIENT)
Dept: FAMILY MEDICINE CLINIC | Age: 48
End: 2023-02-13
Payer: OTHER GOVERNMENT

## 2023-02-13 NOTE — TELEPHONE ENCOUNTER
Pt inf of results on her MRI. She would like a referral placed for ortho and neuro please and thank you

## 2023-02-21 ENCOUNTER — OFFICE VISIT (OUTPATIENT)
Dept: NEUROSURGERY | Facility: CLINIC | Age: 48
End: 2023-02-21
Payer: OTHER GOVERNMENT

## 2023-02-21 VITALS — WEIGHT: 205.5 LBS | BODY MASS INDEX: 33.03 KG/M2 | HEIGHT: 66 IN

## 2023-02-21 DIAGNOSIS — M47.812 CERVICAL SPONDYLOSIS WITHOUT MYELOPATHY: Primary | ICD-10-CM

## 2023-02-21 DIAGNOSIS — M54.12 CERVICAL RADICULOPATHY: ICD-10-CM

## 2023-02-21 PROCEDURE — 99213 OFFICE O/P EST LOW 20 MIN: CPT | Performed by: NURSE PRACTITIONER

## 2023-02-21 NOTE — PROGRESS NOTES
"Chief Complaint  Neck Pain (Neck pain that radiates into bilateral shoulders. Visual changes when she pops her neck)    Subjective          Destiney Thomas who is a 47 y.o. year old female who presents to [facility] for evaluation of cervical spine.      The patient complains of pain located in the cervical spine.  Patients states the pain has been present for several years.  The pain came on gradually.  Pain is primarily in the neck, currently worse on the left side. She has a constant feeling that she needs to pop the neck. When she pops her neck she will have electric, shock sensations through the axial spine. She will also experience vision changes and blacking out temporarily. The pain scale level is 6.  The pain does radiate. Dermatomes are located on left Cervical at: trapz to the shoulder. Does not go into the arm...  The pain is constant and waxing/waning and described as sharp and pressure like.  The pain is worse at no particular time of day. Patient states head turning makes the pain worse.  Patient states NSAIDs and muscle relaxants makes the pain better.    Associated Symptoms Include: Denies numbness and tingling  Conservative Interventions Include: NSAIDs that were somewhat effective. and Muscle Relaxants that were somewhat effective.  She has not had recent physical therapy for her neck. She will do stretches at home.    Was this the result of an injury or accident?: No    History of Previous Spinal Surgery?: No    Nicotine use: smoker  (1 ppd)    BMI: Body mass index is 33.17 kg/m².      Review of Systems   Musculoskeletal: Positive for neck pain and neck stiffness.   Neurological: Positive for dizziness, numbness and headache.   All other systems reviewed and are negative.       Objective   Vital Signs:   Ht 167.6 cm (66\")   Wt 93.2 kg (205 lb 8 oz)   BMI 33.17 kg/m²       Physical Exam  Vitals reviewed.   Constitutional:       Appearance: Normal " appearance.   Musculoskeletal:      Right shoulder: No tenderness. Normal range of motion.      Left shoulder: No tenderness. Normal range of motion.      Cervical back: Tenderness present. Pain with movement present. Decreased range of motion.   Neurological:      Mental Status: She is alert and oriented to person, place, and time.      Motor: Motor strength is normal.      Gait: Gait is intact.      Deep Tendon Reflexes:      Reflex Scores:       Tricep reflexes are 2+ on the right side and 2+ on the left side.       Bicep reflexes are 2+ on the right side and 2+ on the left side.       Brachioradialis reflexes are 2+ on the right side and 2+ on the left side.       Neurologic Exam     Mental Status   Oriented to person, place, and time.   Level of consciousness: alert    Motor Exam   Muscle bulk: normal  Overall muscle tone: normal    Strength   Strength 5/5 throughout.     Sensory Exam   Light touch normal.     Gait, Coordination, and Reflexes     Gait  Gait: normal    Reflexes   Right brachioradialis: 2+  Left brachioradialis: 2+  Right biceps: 2+  Left biceps: 2+  Right triceps: 2+  Left triceps: 2+  Right Quintana: absent  Left Quintana: absent       Result Review :       Data reviewed: Radiologic studies MRI Cervical Spine on 2/10/23 at [facility] personally reviewed. Multilevel spondylosis, most signficant at C5/6 and C6/7. At C5/6 there is moderate spinal canal and severe right, moderately severe left foraminal foraminal stenosis. No change from prior imaging. There has been slight progression at C6/7, where there is a small left paracentral disc protrusion, combined with degenerative changes resulting in moderate spinal canal and severe left neural foraminal stenosis. No evidence of cord signal change.          Assessment and Plan    Diagnoses and all orders for this visit:    1. Cervical spondylosis without myelopathy (Primary)    2. Cervical radiculopathy    Pt presenting for evaluation of chronic neck pain with  radiculopathy. We reviewed her MRI Cervical Spine, demonstrating multilevel spondylosis with facet arthropathy resulting in severe foraminal narrowing at several levels. We discussed that this would contribute to her pain symptoms. Her pain is most significant in the neck. Cervical fusion would be challenging due to multilevel disease and would not provide significant improvement in neck pain.     We discussed the importance of strengthening, stretching, traction, avoidance of activities that worsen the pain, nicotine cessation and maintenance of healthy weight. Surgery for patients with multilevel degenerative disc disease is not typically successful with the risks outweighing the benefit.     She could consider referral to physical. Declines this today.     She will follow up as needed.       Follow Up   No follow-ups on file.  Patient was given instructions and counseling regarding her condition or for health maintenance advice.

## 2023-02-24 DIAGNOSIS — M54.2 CHRONIC NECK PAIN: ICD-10-CM

## 2023-02-24 DIAGNOSIS — G89.29 CHRONIC NECK PAIN: ICD-10-CM

## 2023-02-24 DIAGNOSIS — M25.512 ACUTE PAIN OF LEFT SHOULDER: ICD-10-CM

## 2023-02-24 NOTE — TELEPHONE ENCOUNTER
Caller: Destiney Thomas    Relationship: Self    Best call back number: [phone]    Requested Prescriptions:   Requested Prescriptions     Pending Prescriptions Disp Refills   • diclofenac (VOLTAREN) 50 MG EC tablet 30 tablet 0     Sig: Take 1 tablet by mouth 2 (Two) Times a Day As Needed (pain).   • cyclobenzaprine (FLEXERIL) 5 MG tablet 30 tablet 0     Sig: Take 1 tablet by mouth 3 (Three) Times a Day As Needed for Muscle Spasms.        Pharmacy where request should be sent: [place] [address] [facility] [phone] [facility] [phone] FX     Additional details provided by patient:    THE PATIENT IS OUT OF THE DICLOFENAC     Does the patient have less than a 3 day supply:  [x] Yes  [] No    Would you like a call back once the refill request has been completed: [] Yes [x] No    If the office needs to give you a call back, can they leave a voicemail: [] Yes [x] No    Gladys Sanders Rep   02/24/23 14:41 EST

## 2023-02-27 RX ORDER — CYCLOBENZAPRINE HCL 5 MG
5 TABLET ORAL 3 TIMES DAILY PRN
Qty: 30 TABLET | Refills: 0 | Status: SHIPPED | OUTPATIENT
Start: 2023-02-27

## 2023-02-27 NOTE — TELEPHONE ENCOUNTER
I have given additional 30 tabs of each med. She is due for annual PE and labs, please schedule this for her as if she is going to be using NSAIDs she needs kidney labs checked. Additional refills will need to come from PCP.

## 2023-03-16 ENCOUNTER — OFFICE VISIT (OUTPATIENT)
Dept: FAMILY MEDICINE CLINIC | Age: 48
End: 2023-03-16
Payer: OTHER GOVERNMENT

## 2023-03-16 VITALS
DIASTOLIC BLOOD PRESSURE: 60 MMHG | OXYGEN SATURATION: 98 % | SYSTOLIC BLOOD PRESSURE: 110 MMHG | BODY MASS INDEX: 33.04 KG/M2 | TEMPERATURE: 97.6 F | WEIGHT: 205.6 LBS | HEART RATE: 67 BPM | HEIGHT: 66 IN

## 2023-03-16 DIAGNOSIS — M62.838 MUSCLE SPASM: ICD-10-CM

## 2023-03-16 DIAGNOSIS — Z13.220 LIPID SCREENING: Primary | ICD-10-CM

## 2023-03-16 DIAGNOSIS — H93.13 TINNITUS OF BOTH EARS: ICD-10-CM

## 2023-03-16 PROCEDURE — 99214 OFFICE O/P EST MOD 30 MIN: CPT | Performed by: NURSE PRACTITIONER

## 2023-03-16 RX ORDER — FLUTICASONE PROPIONATE 50 MCG
2 SPRAY, SUSPENSION (ML) NASAL DAILY
Qty: 18 G | Refills: 5 | Status: SHIPPED | OUTPATIENT
Start: 2023-03-16

## 2023-03-16 NOTE — PROGRESS NOTES
"Chief Complaint  Spasms (Follow up)    Subjective          Destiney Thomas presents to [facility] for follow-up on her back spasms.  She was given diclofenac and muscle relaxer from MILA Arias.  She states both are working well for her symptoms.  She would like to continue.  She is also complaining of tinnitus to bilateral ears.  She states since starting diclofenac her right ear has improved somewhat.  She is not taking any allergy medication at this time.  Denies ear pain.  Denies hearing loss.    Objective   Vital Signs:   Vitals:    03/16/23 1257   BP: 110/60   BP Location: Right arm   Patient Position: Sitting   Cuff Size: Adult   Pulse: 67   Temp: 97.6 °F (36.4 °C)   TempSrc: Oral   SpO2: 98%   Weight: 93.3 kg (205 lb 9.6 oz)   Height: 167.6 cm (65.98\")       Physical Exam  Vitals reviewed.   Constitutional:       General: She is not in acute distress.     Appearance: Normal appearance. She is well-developed.   HENT:      Head: Normocephalic and atraumatic.      Right Ear: Ear canal and external ear normal.      Left Ear: Ear canal and external ear normal.      Ears:      Comments: jefe tm effusions. Right worse than left.   Eyes:      Conjunctiva/sclera: Conjunctivae normal.      Pupils: Pupils are equal, round, and reactive to light.   Cardiovascular:      Rate and Rhythm: Normal rate and regular rhythm.      Heart sounds: Normal heart sounds. No murmur heard.  Pulmonary:      Effort: Pulmonary effort is normal. No respiratory distress.      Breath sounds: Normal breath sounds.   Skin:     General: Skin is warm and dry.   Neurological:      Mental Status: She is alert and oriented to person, place, and time.   Psychiatric:         Mood and Affect: Mood and affect normal.         Behavior: Behavior normal.         Thought Content: Thought content normal.         Judgment: Judgment normal.          Result Review :              Assessment and Plan    Diagnoses and all " orders for this visit:    1. Lipid screening (Primary)  Comments:  Will notify patient of results and treat accordingly.  Orders:  -     Lipid Panel; Future    2. Muscle spasm  Comments:  Checking kidney function as diclofenac was started.  Will be able to prescribe if kidney function is within normal limits.  Muscle relaxer as needed.  Orders:  -     Comprehensive Metabolic Panel; Future    3. Tinnitus of both ears  Comments:  Claritin and Flonase daily.  Continue to take diclofenac.  If no improvement or if she starts to have pain, follow-up with office.  Orders:  -     fluticasone (FLONASE) 50 MCG/ACT nasal spray; 2 sprays by Each Nare route Daily.  Dispense: 18 g; Refill: 5    Patient to notify office with any acute concerns or issues.  Patient verbalizes understanding, agrees with plan of care and has no further questions upon discharge.    Please note that portions of this note were completed with a voice recognition program.    Follow Up    Return if symptoms worsen or fail to improve.  Patient was given instructions and counseling regarding her condition or for health maintenance advice. Please see specific information pulled into the AVS if appropriate.

## 2023-03-27 ENCOUNTER — TELEPHONE (OUTPATIENT)
Dept: FAMILY MEDICINE CLINIC | Age: 48
End: 2023-03-27
Payer: OTHER GOVERNMENT

## 2023-03-30 ENCOUNTER — LAB (OUTPATIENT)
Dept: LAB | Facility: HOSPITAL | Age: 48
End: 2023-03-30
Payer: OTHER GOVERNMENT

## 2023-03-30 DIAGNOSIS — Z13.220 LIPID SCREENING: ICD-10-CM

## 2023-03-30 DIAGNOSIS — M62.838 MUSCLE SPASM: ICD-10-CM

## 2023-03-30 LAB
ALBUMIN SERPL-MCNC: 4.4 G/DL (ref 3.5–5.2)
ALBUMIN/GLOB SERPL: 1.6 G/DL
ALP SERPL-CCNC: 80 U/L (ref 39–117)
ALT SERPL W P-5'-P-CCNC: 19 U/L (ref 1–33)
ANION GAP SERPL CALCULATED.3IONS-SCNC: 8.6 MMOL/L (ref 5–15)
AST SERPL-CCNC: 22 U/L (ref 1–32)
BILIRUB SERPL-MCNC: 0.4 MG/DL (ref 0–1.2)
BUN SERPL-MCNC: 11 MG/DL (ref 6–20)
BUN/CREAT SERPL: 14.5 (ref 7–25)
CALCIUM SPEC-SCNC: 9.9 MG/DL (ref 8.6–10.5)
CHLORIDE SERPL-SCNC: 106 MMOL/L (ref 98–107)
CHOLEST SERPL-MCNC: 226 MG/DL (ref 0–200)
CO2 SERPL-SCNC: 25.4 MMOL/L (ref 22–29)
CREAT SERPL-MCNC: 0.76 MG/DL (ref 0.57–1)
EGFRCR SERPLBLD CKD-EPI 2021: 96.2 ML/MIN/1.73
GLOBULIN UR ELPH-MCNC: 2.8 GM/DL
GLUCOSE SERPL-MCNC: 91 MG/DL (ref 65–99)
HDLC SERPL-MCNC: 74 MG/DL (ref 40–60)
LDLC SERPL CALC-MCNC: 141 MG/DL (ref 0–100)
LDLC/HDLC SERPL: 1.89 {RATIO}
POTASSIUM SERPL-SCNC: 4.1 MMOL/L (ref 3.5–5.2)
PROT SERPL-MCNC: 7.2 G/DL (ref 6–8.5)
SODIUM SERPL-SCNC: 140 MMOL/L (ref 136–145)
TRIGL SERPL-MCNC: 62 MG/DL (ref 0–150)
VLDLC SERPL-MCNC: 11 MG/DL (ref 5–40)

## 2023-03-30 PROCEDURE — 80053 COMPREHEN METABOLIC PANEL: CPT

## 2023-03-30 PROCEDURE — 80061 LIPID PANEL: CPT

## 2023-03-30 PROCEDURE — 36415 COLL VENOUS BLD VENIPUNCTURE: CPT

## 2023-04-10 DIAGNOSIS — M54.2 CHRONIC NECK PAIN: ICD-10-CM

## 2023-04-10 DIAGNOSIS — G89.29 CHRONIC NECK PAIN: ICD-10-CM

## 2023-04-10 DIAGNOSIS — M25.512 ACUTE PAIN OF LEFT SHOULDER: ICD-10-CM

## 2023-04-10 NOTE — TELEPHONE ENCOUNTER
Caller: Destiney Thomas    Relationship: Self    Best call back number: [phone]    Requested Prescriptions:   Requested Prescriptions     Pending Prescriptions Disp Refills   • diclofenac (VOLTAREN) 50 MG EC tablet 30 tablet 0     Sig: Take 1 tablet by mouth 2 (Two) Times a Day As Needed (pain).   • cyclobenzaprine (FLEXERIL) 5 MG tablet 30 tablet 0     Sig: Take 1 tablet by mouth 3 (Three) Times a Day As Needed for Muscle Spasms.      Pharmacy where request should be sent: [place] [address] [address] [facility] [phone] [facility] [phone] FX     Last office visit with prescribing clinician: 3/16/2023   Last telemedicine visit with prescribing clinician: 9/18/2023   Next office visit with prescribing clinician: 9/18/2023     Does the patient have less than a 3 day supply:  [x] Yes  [] No    Would you like a call back once the refill request has been completed: [] Yes [x] No    Gladys Michele Rep   04/10/23 09:52 EDT

## 2023-04-11 RX ORDER — CYCLOBENZAPRINE HCL 5 MG
5 TABLET ORAL 3 TIMES DAILY PRN
Qty: 30 TABLET | Refills: 0 | Status: SHIPPED | OUTPATIENT
Start: 2023-04-11

## 2023-06-08 ENCOUNTER — TELEPHONE (OUTPATIENT)
Dept: FAMILY MEDICINE CLINIC | Age: 48
End: 2023-06-08
Payer: OTHER GOVERNMENT

## 2023-06-08 DIAGNOSIS — G89.29 CHRONIC NECK PAIN: ICD-10-CM

## 2023-06-08 DIAGNOSIS — M54.2 CHRONIC NECK PAIN: ICD-10-CM

## 2023-06-08 DIAGNOSIS — M25.512 ACUTE PAIN OF LEFT SHOULDER: ICD-10-CM

## 2023-06-08 DIAGNOSIS — H93.13 TINNITUS OF BOTH EARS: ICD-10-CM

## 2023-06-08 NOTE — TELEPHONE ENCOUNTER
Caller: Nhan Thomasn C    Relationship: Self    Best call back number: [phone]     Requested Prescriptions:   Requested Prescriptions     Pending Prescriptions Disp Refills    diclofenac (VOLTAREN) 50 MG EC tablet 30 tablet 0     Sig: Take 1 tablet by mouth 2 (Two) Times a Day As Needed (pain).    fluticasone (FLONASE) 50 MCG/ACT nasal spray 18 g 5     Si sprays by Each Nare route Daily.        Pharmacy where request should be sent: [place] [address] [facility] [phone] [facility] [phone] FX     Last office visit with prescribing clinician: 3/16/2023   Last telemedicine visit with prescribing clinician: Visit date not found   Next office visit with prescribing clinician: 2023     Does the patient have less than a 3 day supply:  [x] Yes  [] No    Would you like a call back once the refill request has been completed: [] Yes [x] No    If the office needs to give you a call back, can they leave a voicemail: [] Yes [x] No    Gladys Portillo   23 14:08 EDT

## 2023-06-10 DIAGNOSIS — M25.512 ACUTE PAIN OF LEFT SHOULDER: ICD-10-CM

## 2023-06-10 DIAGNOSIS — M54.2 CHRONIC NECK PAIN: ICD-10-CM

## 2023-06-10 DIAGNOSIS — G89.29 CHRONIC NECK PAIN: ICD-10-CM

## 2023-06-12 RX ORDER — FLUTICASONE PROPIONATE 50 MCG
2 SPRAY, SUSPENSION (ML) NASAL DAILY
Qty: 18 G | Refills: 5 | Status: SHIPPED | OUTPATIENT
Start: 2023-06-12

## 2023-08-03 ENCOUNTER — OFFICE VISIT (OUTPATIENT)
Dept: FAMILY MEDICINE CLINIC | Age: 48
End: 2023-08-03
Payer: OTHER GOVERNMENT

## 2023-08-03 ENCOUNTER — HOSPITAL ENCOUNTER (OUTPATIENT)
Dept: GENERAL RADIOLOGY | Facility: HOSPITAL | Age: 48
Discharge: HOME | End: 2023-08-03
Admitting: NURSE PRACTITIONER
Payer: OTHER GOVERNMENT

## 2023-08-03 VITALS
BODY MASS INDEX: 33.75 KG/M2 | OXYGEN SATURATION: 100 % | WEIGHT: 210 LBS | TEMPERATURE: 98.2 F | DIASTOLIC BLOOD PRESSURE: 75 MMHG | SYSTOLIC BLOOD PRESSURE: 124 MMHG | HEART RATE: 66 BPM | HEIGHT: 66 IN

## 2023-08-03 DIAGNOSIS — M25.649 THUMB JOINT STIFFNESS: ICD-10-CM

## 2023-08-03 DIAGNOSIS — Z11.59 ENCOUNTER FOR SCREENING FOR OTHER VIRAL DISEASES: ICD-10-CM

## 2023-08-03 DIAGNOSIS — E78.2 MIXED HYPERLIPIDEMIA: Primary | ICD-10-CM

## 2023-08-03 PROBLEM — M25.572 ACUTE LEFT ANKLE PAIN: Status: RESOLVED | Noted: 2021-09-24 | Resolved: 2023-08-03

## 2023-08-03 PROBLEM — R21 RASH AND NONSPECIFIC SKIN ERUPTION: Status: RESOLVED | Noted: 2021-08-05 | Resolved: 2023-08-03

## 2023-08-03 PROBLEM — M79.671 RIGHT FOOT PAIN: Status: RESOLVED | Noted: 2021-09-17 | Resolved: 2023-08-03

## 2023-08-03 PROCEDURE — 99214 OFFICE O/P EST MOD 30 MIN: CPT | Performed by: NURSE PRACTITIONER

## 2023-08-03 PROCEDURE — 73140 X-RAY EXAM OF FINGER(S): CPT

## 2023-08-03 RX ORDER — DICLOFENAC SODIUM 75 MG/1
75 TABLET, DELAYED RELEASE ORAL 2 TIMES DAILY PRN
Qty: 180 TABLET | Refills: 1 | Status: SHIPPED | OUTPATIENT
Start: 2023-08-03

## 2023-08-03 NOTE — PROGRESS NOTES
"Chief Complaint  Upper Extremity Issue (Thumb on LT hand is locking up X 1 month, no known injuries. )    Subjective        Destiney Thomas presents to [facility]  49 yo presenting with stiffness to left thumb.She reports that it has been going on x 1 month. She reports it as an ache that is worse in the morning. She denies any trauma or injury. She denies any relieving factors. She stated that she sometimes has to hit her finger on something to get it to start moving in the mornings. 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     Objective   Vital Signs:  /75 (BP Location: Right arm, Patient Position: Sitting, Cuff Size: Adult)   Pulse 66   Temp 98.2 øF (36.8 øC) (Oral)   Ht 167.6 cm (65.98\")   Wt 95.3 kg (210 lb)   SpO2 100%   BMI 33.92 kg/mý   Estimated body mass index is 33.92 kg/mý as calculated from the following:    Height as of this encounter: 167.6 cm (65.98\").    Weight as of this encounter: 95.3 kg (210 lb).             Physical Exam  HENT:      Head: Normocephalic.      Right Ear: External ear normal.      Left Ear: External ear normal.      Nose: Nose normal.   Eyes:      Conjunctiva/sclera: Conjunctivae normal.      Pupils: Pupils are equal, round, and reactive to light.   Cardiovascular:      Rate and Rhythm: Normal rate and regular rhythm.      Pulses: Normal pulses.      Heart sounds: Normal heart sounds.   Pulmonary:      Effort: Pulmonary effort is normal.      Breath sounds: Normal breath sounds.   Musculoskeletal:      Cervical back: Normal range of motion.      Comments: Left thumb with clicking of joint   Skin:     General: Skin is warm and dry.   Neurological:      Mental Status: She is alert and oriented to person, place, and time.   Psychiatric:         Mood and Affect: Mood normal.         Behavior: Behavior normal.         Thought Content: Thought content normal.         Judgment: Judgment normal.      Result Review " :    Comprehensive Metabolic Panel (03/30/2023 09:30)    Lipid Panel (03/30/2023 09:30)    GILES With / DsDNA, RNP, Sjogrens A / B, Gutierrez (08/14/2020 10:29)               Assessment and Plan   Diagnoses and all orders for this visit:    1. Mixed hyperlipidemia (Primary)  Comments:  Will notify patient of results and treat accordingly.  Orders:  -     Comprehensive Metabolic Panel; Future  -     Lipid Panel; Future    2. Thumb joint stiffness  Comments:  Increase Diclofenac to 75mg BID. Start Tylenol Arthritis as needed. Right thumb X-ray. Instructed on use of heat and cold temperatures to manage symptoms.  Orders:  -     XR Finger 2+ View Left (In Office)  -     diclofenac (VOLTAREN) 75 MG EC tablet; Take 1 tablet by mouth 2 (Two) Times a Day As Needed (pain).  Dispense: 180 tablet; Refill: 1    3. Encounter for screening for other viral diseases  Comments:  Will notify patient of results and treat accordingly.  Orders:  -     Hepatitis C Antibody; Future    Patient to notify office with any acute concerns or issues.  Patient verbalizes understanding, agrees with plan of care and has no further questions upon discharge.    Please note that portions of this note were completed with a voice recognition program.         Follow Up   Return for Next scheduled follow up.  Patient was given instructions and counseling regarding her condition or for health maintenance advice. Please see specific information pulled into the AVS if appropriate.     Also seen by Lenora Solorzano RN, NP student.

## 2023-08-29 ENCOUNTER — OFFICE VISIT (OUTPATIENT)
Dept: FAMILY MEDICINE CLINIC | Age: 48
End: 2023-08-29
Payer: OTHER GOVERNMENT

## 2023-08-29 VITALS
OXYGEN SATURATION: 99 % | BODY MASS INDEX: 32.78 KG/M2 | TEMPERATURE: 97.6 F | WEIGHT: 204 LBS | HEIGHT: 66 IN | HEART RATE: 79 BPM | SYSTOLIC BLOOD PRESSURE: 119 MMHG | DIASTOLIC BLOOD PRESSURE: 81 MMHG

## 2023-08-29 DIAGNOSIS — R05.1 ACUTE COUGH: ICD-10-CM

## 2023-08-29 DIAGNOSIS — U07.1 COVID: Primary | ICD-10-CM

## 2023-08-29 LAB
EXPIRATION DATE: ABNORMAL
FLUAV AG UPPER RESP QL IA.RAPID: NOT DETECTED
FLUBV AG UPPER RESP QL IA.RAPID: NOT DETECTED
INTERNAL CONTROL: ABNORMAL
Lab: ABNORMAL
SARS-COV-2 AG UPPER RESP QL IA.RAPID: DETECTED

## 2023-08-29 PROCEDURE — 99213 OFFICE O/P EST LOW 20 MIN: CPT | Performed by: NURSE PRACTITIONER

## 2023-08-29 PROCEDURE — 87428 SARSCOV & INF VIR A&B AG IA: CPT | Performed by: NURSE PRACTITIONER

## 2023-08-29 RX ORDER — PREDNISONE 10 MG/1
TABLET ORAL
COMMUNITY
Start: 2023-08-12 | End: 2023-08-29

## 2023-08-29 RX ORDER — SULFAMETHOXAZOLE AND TRIMETHOPRIM 800; 160 MG/1; MG/1
1 TABLET ORAL EVERY 12 HOURS SCHEDULED
COMMUNITY
Start: 2023-08-12 | End: 2023-08-29

## 2023-08-29 RX ORDER — BROMPHENIRAMINE MALEATE, PSEUDOEPHEDRINE HYDROCHLORIDE, AND DEXTROMETHORPHAN HYDROBROMIDE 2; 30; 10 MG/5ML; MG/5ML; MG/5ML
10 SYRUP ORAL 4 TIMES DAILY PRN
Qty: 473 ML | Refills: 0 | Status: SHIPPED | OUTPATIENT
Start: 2023-08-29

## 2023-08-29 NOTE — PROGRESS NOTES
"Chief Complaint  Sinus Problem (Pt c/o sinus issues onset for about 2 days now. Pt c/o bilateral ear pressure, acute cough, nose congestion./)    Subjective          Destiney Thomas presents to [facility]  History of Present Illness  URI symptoms x 2 days. No known sick contacts.  URI   This is a new problem. The current episode started in the past 7 days. The problem has been unchanged. There has been no fever. Associated symptoms include congestion (productive-green sputum), coughing, ear pain, headaches, a plugged ear sensation, rhinorrhea (occasional), sinus pain and sneezing. Pertinent negatives include no diarrhea, nausea, sore throat, swollen glands, vomiting or wheezing. Associated symptoms comments: Denies; myalgia. She has tried antihistamine (Flonase) for the symptoms. The treatment provided no relief.     Objective   Vital Signs:   /81 (BP Location: Left arm, Patient Position: Sitting)   Pulse 79   Temp 97.6 øF (36.4 øC) (Oral)   Ht 167.6 cm (65.98\")   Wt 92.5 kg (204 lb)   SpO2 99% Comment: room air  BMI 32.95 kg/mý     Physical Exam  Constitutional:       Appearance: Normal appearance.   HENT:      Head: Normocephalic.      Right Ear: Ear canal and external ear normal. Tympanic membrane is erythematous and bulging.      Left Ear: Ear canal and external ear normal. A middle ear effusion is present.      Nose: Congestion present.      Right Sinus: No maxillary sinus tenderness or frontal sinus tenderness.      Left Sinus: No maxillary sinus tenderness or frontal sinus tenderness.      Mouth/Throat:      Mouth: Mucous membranes are moist.      Pharynx: Oropharynx is clear. No oropharyngeal exudate or posterior oropharyngeal erythema.   Eyes:      Conjunctiva/sclera: Conjunctivae normal.      Pupils: Pupils are equal, round, and reactive to light.   Cardiovascular:      Rate and Rhythm: Normal rate and regular rhythm.      Pulses: Normal pulses.      Heart " sounds: Normal heart sounds.   Pulmonary:      Effort: Pulmonary effort is normal. No respiratory distress.      Breath sounds: Normal breath sounds. No wheezing or rhonchi.   Musculoskeletal:      Cervical back: Normal range of motion.   Lymphadenopathy:      Cervical: No cervical adenopathy.   Neurological:      Mental Status: She is alert and oriented to person, place, and time.   Psychiatric:         Mood and Affect: Mood normal.         Behavior: Behavior normal.         Thought Content: Thought content normal.      Result Review :   The following data was reviewed by: MILA Coleman on 08/29/2023:                  Assessment and Plan    Diagnoses and all orders for this visit:    1. COVID (Primary)  -     brompheniramine-pseudoephedrine-DM 30-2-10 MG/5ML syrup; Take 10 mL by mouth 4 (Four) Times a Day As Needed for Allergies.  Dispense: 473 mL; Refill: 0    2. Acute cough  -     POCT SARS-CoV-2 Antigen RODOLFO + Flu    She is positive for COVID.  We have discussed isolation precautions.  We have also discussed symptoms that warrant a visit to the ED.      Follow Up   Return if symptoms worsen or fail to improve.  Patient was given instructions and counseling regarding her condition or for health maintenance advice. Please see specific information pulled into the AVS if appropriate.

## 2023-09-05 ENCOUNTER — OFFICE VISIT (OUTPATIENT)
Dept: FAMILY MEDICINE CLINIC | Age: 48
End: 2023-09-05
Payer: OTHER GOVERNMENT

## 2023-09-05 VITALS
HEIGHT: 66 IN | SYSTOLIC BLOOD PRESSURE: 142 MMHG | DIASTOLIC BLOOD PRESSURE: 72 MMHG | BODY MASS INDEX: 34.33 KG/M2 | HEART RATE: 93 BPM | WEIGHT: 213.6 LBS | OXYGEN SATURATION: 99 % | TEMPERATURE: 97.7 F

## 2023-09-05 DIAGNOSIS — R09.81 NASAL CONGESTION: Primary | ICD-10-CM

## 2023-09-05 LAB
EXPIRATION DATE: NORMAL
FLUAV AG UPPER RESP QL IA.RAPID: NOT DETECTED
FLUBV AG UPPER RESP QL IA.RAPID: NOT DETECTED
INTERNAL CONTROL: NORMAL
Lab: NORMAL
SARS-COV-2 AG UPPER RESP QL IA.RAPID: NOT DETECTED

## 2023-09-05 PROCEDURE — 99213 OFFICE O/P EST LOW 20 MIN: CPT | Performed by: NURSE PRACTITIONER

## 2023-09-05 PROCEDURE — 87428 SARSCOV & INF VIR A&B AG IA: CPT | Performed by: NURSE PRACTITIONER

## 2023-09-05 RX ORDER — GUAIFENESIN 600 MG/1
1200 TABLET, EXTENDED RELEASE ORAL 2 TIMES DAILY
Qty: 40 TABLET | Refills: 0 | Status: SHIPPED | OUTPATIENT
Start: 2023-09-05

## 2023-09-05 NOTE — PROGRESS NOTES
"Chief Complaint  Cough (Sx started last week ), Nasal Congestion (Pt tested last Tuesday ), and Laryngitis    Subjective        Destiney Thomas presents to [facility]  Cough  This is a new problem. The current episode started 1 to 4 weeks ago. The problem has been gradually improving. The cough is Productive of sputum. Associated symptoms include ear congestion, ear pain, headaches, nasal congestion, postnasal drip and rhinorrhea. Pertinent negatives include no chest pain, chills, fever, sore throat or shortness of breath. Treatments tried: Tylenol, dayquil.     Objective   Vital Signs:  /72 (BP Location: Left arm, Patient Position: Sitting, Cuff Size: Adult)   Pulse 93   Temp 97.7 °F (36.5 °C) (Oral)   Ht 167.6 cm (65.98\")   Wt 96.9 kg (213 lb 9.6 oz)   SpO2 99% Comment: room air  BMI 34.50 kg/m²   Estimated body mass index is 34.5 kg/m² as calculated from the following:    Height as of this encounter: 167.6 cm (65.98\").    Weight as of this encounter: 96.9 kg (213 lb 9.6 oz).             Physical Exam  HENT:      Head: Normocephalic.      Right Ear: A middle ear effusion is present.      Left Ear: A middle ear effusion is present.      Nose: Congestion present.      Right Sinus: Maxillary sinus tenderness present.      Left Sinus: Maxillary sinus tenderness present.   Cardiovascular:      Rate and Rhythm: Normal rate and regular rhythm.   Pulmonary:      Effort: Pulmonary effort is normal. No respiratory distress.      Breath sounds: Normal breath sounds. No stridor. No wheezing, rhonchi or rales.   Skin:     General: Skin is warm and dry.   Neurological:      Mental Status: She is alert and oriented to person, place, and time.   Psychiatric:         Mood and Affect: Mood normal.      Result Review :          Results for orders placed or performed in visit on 09/05/23   POCT SARS-CoV-2 Antigen RODOLFO + Flu    Specimen: Swab   Result Value Ref Range    SARS Antigen Not " Detected Not Detected, Presumptive Negative    Influenza A Antigen RODOLFO Not Detected Not Detected    Influenza B Antigen RODOLFO Not Detected Not Detected    Internal Control Passed Passed    Lot Number [id number]     Expiration Date 01/03/24               Assessment and Plan   Diagnoses and all orders for this visit:    1. Nasal congestion (Primary)  Comments:  Force fluids, OTC symptomatic treatment as needed, F/U for worsening  Orders:  -     POCT SARS-CoV-2 Antigen RODOLFO + Flu  -     guaiFENesin (Mucinex) 600 MG 12 hr tablet; Take 2 tablets by mouth 2 (Two) Times a Day.  Dispense: 40 tablet; Refill: 0             Follow Up   Return if symptoms worsen or fail to improve.  Patient was given instructions and counseling regarding her condition or for health maintenance advice. Please see specific information pulled into the AVS if appropriate.

## 2023-09-18 ENCOUNTER — OFFICE VISIT (OUTPATIENT)
Dept: FAMILY MEDICINE CLINIC | Age: 48
End: 2023-09-18
Payer: OTHER GOVERNMENT

## 2023-09-18 VITALS
BODY MASS INDEX: 34.68 KG/M2 | HEIGHT: 66 IN | HEART RATE: 76 BPM | TEMPERATURE: 97.9 F | SYSTOLIC BLOOD PRESSURE: 128 MMHG | WEIGHT: 215.8 LBS | OXYGEN SATURATION: 96 % | DIASTOLIC BLOOD PRESSURE: 68 MMHG

## 2023-09-18 DIAGNOSIS — E66.01 CLASS 2 SEVERE OBESITY DUE TO EXCESS CALORIES WITH SERIOUS COMORBIDITY AND BODY MASS INDEX (BMI) OF 35.0 TO 35.9 IN ADULT: ICD-10-CM

## 2023-09-18 DIAGNOSIS — Z01.419 WELL WOMAN EXAM: Primary | ICD-10-CM

## 2023-09-18 DIAGNOSIS — J06.9 ACUTE URI: ICD-10-CM

## 2023-09-18 PROBLEM — E66.09 CLASS 1 OBESITY DUE TO EXCESS CALORIES WITHOUT SERIOUS COMORBIDITY WITH BODY MASS INDEX (BMI) OF 34.0 TO 34.9 IN ADULT: Status: ACTIVE | Noted: 2023-09-18

## 2023-09-18 PROBLEM — E66.811 CLASS 1 OBESITY DUE TO EXCESS CALORIES WITHOUT SERIOUS COMORBIDITY WITH BODY MASS INDEX (BMI) OF 34.0 TO 34.9 IN ADULT: Status: ACTIVE | Noted: 2023-09-18

## 2023-09-18 PROCEDURE — G0123 SCREEN CERV/VAG THIN LAYER: HCPCS | Performed by: NURSE PRACTITIONER

## 2023-09-18 PROCEDURE — 87624 HPV HI-RISK TYP POOLED RSLT: CPT | Performed by: NURSE PRACTITIONER

## 2023-09-18 RX ORDER — AZITHROMYCIN 250 MG/1
TABLET, FILM COATED ORAL
Qty: 6 TABLET | Refills: 0 | Status: SHIPPED | OUTPATIENT
Start: 2023-09-18

## 2023-09-18 RX ORDER — ALBUTEROL SULFATE 90 UG/1
2 AEROSOL, METERED RESPIRATORY (INHALATION) EVERY 4 HOURS PRN
Qty: 18 G | Refills: 2 | Status: SHIPPED | OUTPATIENT
Start: 2023-09-18

## 2023-09-18 RX ORDER — BROMPHENIRAMINE MALEATE, PSEUDOEPHEDRINE HYDROCHLORIDE, AND DEXTROMETHORPHAN HYDROBROMIDE 2; 30; 10 MG/5ML; MG/5ML; MG/5ML
10 SYRUP ORAL 4 TIMES DAILY PRN
Qty: 118 ML | Refills: 0 | Status: SHIPPED | OUTPATIENT
Start: 2023-09-18

## 2023-09-18 NOTE — PROGRESS NOTES
"Chief Complaint  Gynecologic Exam    Subjective          Destiney Thomas presents to [facility] for well woman exam. She was dx with COVID on 8/29/23. She is still c/o cough and sinus pressure. States that cough is productive. Pt declines flu, shingles, covid, pneumonia, tdap, colonoscopy and lung cancer screening.     Review of Systems   Constitutional:  Positive for fatigue.   HENT:  Positive for sinus pressure. Negative for hearing loss and trouble swallowing.    Eyes:  Negative for blurred vision.   Respiratory:  Positive for cough and shortness of breath.    Cardiovascular:  Negative for chest pain, palpitations and leg swelling.   Gastrointestinal:  Negative for abdominal pain, constipation, diarrhea, nausea and vomiting.   Genitourinary:  Negative for dysuria, frequency and urgency.   Musculoskeletal:  Negative for myalgias.   Skin:  Negative for color change and rash.   Neurological:  Positive for headache. Negative for dizziness and weakness.   Psychiatric/Behavioral:  Negative for sleep disturbance, suicidal ideas and depressed mood. The patient is not nervous/anxious.          Objective   Vital Signs:   Vitals:    09/18/23 1424   BP: 128/68   BP Location: Left arm   Patient Position: Sitting   Cuff Size: Large Adult   Pulse: 76   Temp: 97.9 øF (36.6 øC)   TempSrc: Oral   SpO2: 96%   Weight: 97.9 kg (215 lb 12.8 oz)   Height: 167.6 cm (65.98\")       Physical Exam  Vitals reviewed. Chaperone present: declined.   Constitutional:       General: She is not in acute distress.     Appearance: She is well-developed. She is ill-appearing. She is not diaphoretic.   HENT:      Head: Normocephalic and atraumatic. Hair is normal.      Right Ear: Hearing and external ear normal. No decreased hearing noted. No drainage.      Left Ear: Hearing and external ear normal. No decreased hearing noted.      Nose: No nasal deformity.   Eyes:      General: Lids are normal.         Right eye: No " discharge.         Left eye: No discharge.      Extraocular Movements: Extraocular movements intact.      Conjunctiva/sclera: Conjunctivae normal.      Pupils: Pupils are equal, round, and reactive to light.   Neck:      Thyroid: No thyromegaly.   Cardiovascular:      Rate and Rhythm: Normal rate and regular rhythm.      Pulses: Normal pulses.      Heart sounds: Normal heart sounds. No murmur heard.     No friction rub. No gallop.   Pulmonary:      Effort: Pulmonary effort is normal. No respiratory distress.      Breath sounds: Normal breath sounds. No wheezing or rales.   Chest:      Chest wall: No tenderness.   Breasts:     Breasts are symmetrical.      Right: Normal. No mass, nipple discharge, skin change or tenderness.      Left: Normal. No mass, nipple discharge, skin change or tenderness.   Abdominal:      General: Bowel sounds are normal. There is no distension.      Palpations: Abdomen is soft. There is no mass.      Tenderness: There is no abdominal tenderness. There is no guarding or rebound.      Hernia: No hernia is present.   Genitourinary:     General: Normal vulva.      Pubic Area: No rash.       Labia:         Right: No tenderness or lesion.         Left: No tenderness or lesion.       Urethra: No urethral swelling.      Vagina: Normal. No vaginal discharge, tenderness or lesions.      Cervix: Normal.      Uterus: Normal. Not tender.       Adnexa: Right adnexa normal and left adnexa normal.        Right: No tenderness.          Left: No mass.        Rectum: Normal.   Musculoskeletal:         General: No tenderness or deformity. Normal range of motion.      Cervical back: Normal range of motion and neck supple.   Lymphadenopathy:      Cervical: No cervical adenopathy.      Lower Body: No right inguinal adenopathy. No left inguinal adenopathy.   Skin:     General: Skin is warm and dry.      Findings: No erythema or rash.   Neurological:      Mental Status: She is alert and oriented to person, place,  and time.      Motor: No abnormal muscle tone.      Coordination: Coordination normal.   Psychiatric:         Mood and Affect: Mood normal.         Behavior: Behavior normal.         Thought Content: Thought content normal.         Judgment: Judgment normal.          Result Review :              Assessment and Plan    Diagnoses and all orders for this visit:    1. Well woman exam (Primary)  Comments:  Counseled on routine mammograms, flu, shingles, covid, pneumonia, tdap, colonoscopy and lung cancer screening. Will notify pt of results.  Orders:  -     IgP, Aptima HPV; Future  -     IgP, Aptima HPV  -     PDF Report  -     Mammo Screening Digital Tomosynthesis Bilateral With CAD; Future    2. Acute URI  Comments:  Complete rx abx. Use albuterol and bromfed as needed. tylenol/diclofenac as needed.  Orders:  -     azithromycin (ZITHROMAX) 250 MG tablet; Take 2 tablets po the first day, then 1 tablet po daily for 4 days.  Dispense: 6 tablet; Refill: 0  -     albuterol sulfate  (90 Base) MCG/ACT inhaler; Inhale 2 puffs Every 4 (Four) Hours As Needed for Wheezing or Shortness of Air.  Dispense: 18 g; Refill: 2  -     brompheniramine-pseudoephedrine-DM 30-2-10 MG/5ML syrup; Take 10 mL by mouth 4 (Four) Times a Day As Needed for Allergies.  Dispense: 118 mL; Refill: 0    3. Class 2 severe obesity due to excess calories with serious comorbidity and body mass index (BMI) of 35.0 to 35.9 in adult  Assessment & Plan:  Patient's (Body mass index is 34.85 kg/mý.) indicates that they are obese (BMI >30) with health conditions that include none . Weight is worsening. BMI  is above average; BMI management plan is completed. We discussed portion control and increasing exercise.       Patient to notify office with any acute concerns or issues.  Patient verbalizes understanding, agrees with plan of care and has no further questions upon discharge.    Please note that portions of this note were completed with a voice recognition  program.    Follow Up    Return in about 1 year (around 9/18/2024) for Annual physical.  Patient was given instructions and counseling regarding her condition or for health maintenance advice. Please see specific information pulled into the AVS if appropriate.

## 2023-09-20 ENCOUNTER — TELEPHONE (OUTPATIENT)
Dept: FAMILY MEDICINE CLINIC | Age: 48
End: 2023-09-20
Payer: OTHER GOVERNMENT

## 2023-09-20 ENCOUNTER — OFFICE VISIT (OUTPATIENT)
Dept: NEUROLOGY | Facility: CLINIC | Age: 48
End: 2023-09-20
Payer: OTHER GOVERNMENT

## 2023-09-20 VITALS
DIASTOLIC BLOOD PRESSURE: 63 MMHG | HEART RATE: 88 BPM | HEIGHT: 66 IN | BODY MASS INDEX: 35.2 KG/M2 | WEIGHT: 219 LBS | SYSTOLIC BLOOD PRESSURE: 119 MMHG

## 2023-09-20 DIAGNOSIS — M62.838 MUSCLE SPASMS OF NECK: ICD-10-CM

## 2023-09-20 DIAGNOSIS — G43.019 INTRACTABLE MIGRAINE WITHOUT AURA AND WITHOUT STATUS MIGRAINOSUS: Primary | ICD-10-CM

## 2023-09-20 RX ORDER — RIZATRIPTAN BENZOATE 10 MG/1
10 TABLET, ORALLY DISINTEGRATING ORAL ONCE AS NEEDED
Qty: 12 TABLET | Refills: 11 | Status: SHIPPED | OUTPATIENT
Start: 2023-09-20 | End: 2023-10-20

## 2023-09-20 RX ORDER — TIZANIDINE 4 MG/1
4 TABLET ORAL AS NEEDED
Qty: 30 TABLET | Refills: 11 | Status: SHIPPED | OUTPATIENT
Start: 2023-09-20

## 2023-09-20 NOTE — PROGRESS NOTES
"Chief Complaint  Migraine    Subjective          Destiney Thomas presents to [facility]  History of Present Illness  Headaches remain managed.  Using tizanidine PRN.  Using Maxalt PRN for abortive therapy.  Having 2-3 headaches per month.  Muscle spasms are well managed.     Objective   Vital Signs:   /63   Pulse 88   Ht 167.6 cm (65.98\")   Wt 99.3 kg (219 lb)   BMI 35.37 kg/m²     Physical Exam  HENT:      Head: Normocephalic.   Pulmonary:      Effort: Pulmonary effort is normal.   Neurological:      Mental Status: She is alert and oriented to person, place, and time.      Sensory: Sensation is intact.      Motor: Motor function is intact.      Coordination: Coordination is intact.      Deep Tendon Reflexes: Reflexes are normal and symmetric.      Neurologic Exam     Mental Status   Oriented to person, place, and time.      Result Review :               Assessment and Plan    Diagnoses and all orders for this visit:    1. Intractable migraine without aura and without status migrainosus (Primary)  Assessment & Plan:  Continue Maxalt PRN.         2. Muscle spasms of neck  Assessment & Plan:  Continue tizanidine      Other orders  -     rizatriptan MLT (MAXALT-MLT) 10 MG disintegrating tablet; Place 1 tablet on the tongue 1 (One) Time As Needed for Migraine for up to 30 days. May repeat in 2 hours if needed  Dispense: 12 tablet; Refill: 11  -     tiZANidine (ZANAFLEX) 4 MG tablet; Take 1 tablet by mouth As Needed for Muscle Spasms.  Dispense: 30 tablet; Refill: 11        Follow Up   Return in about 1 year (around 9/20/2024) for Migraine f/u.  Patient was given instructions and counseling regarding her condition or for health maintenance advice. Please see specific information pulled into the AVS if appropriate.       "

## 2023-09-21 LAB
CYTOLOGIST CVX/VAG CYTO: NORMAL
CYTOLOGY CVX/VAG DOC CYTO: NORMAL
CYTOLOGY CVX/VAG DOC THIN PREP: NORMAL
DX ICD CODE: NORMAL
HIV 1 & 2 AB SER-IMP: NORMAL
HPV I/H RISK 4 DNA CVX QL PROBE+SIG AMP: NEGATIVE
OTHER STN SPEC: NORMAL
STAT OF ADQ CVX/VAG CYTO-IMP: NORMAL

## 2023-09-29 ENCOUNTER — LAB (OUTPATIENT)
Dept: LAB | Facility: HOSPITAL | Age: 48
End: 2023-09-29
Payer: OTHER GOVERNMENT

## 2023-09-29 DIAGNOSIS — E78.2 MIXED HYPERLIPIDEMIA: ICD-10-CM

## 2023-09-29 DIAGNOSIS — Z11.59 ENCOUNTER FOR SCREENING FOR OTHER VIRAL DISEASES: ICD-10-CM

## 2023-09-29 LAB
ALBUMIN SERPL-MCNC: 4.4 G/DL (ref 3.5–5.2)
ALBUMIN/GLOB SERPL: 1.6 G/DL
ALP SERPL-CCNC: 102 U/L (ref 39–117)
ALT SERPL W P-5'-P-CCNC: 19 U/L (ref 1–33)
ANION GAP SERPL CALCULATED.3IONS-SCNC: 9.4 MMOL/L (ref 5–15)
AST SERPL-CCNC: 25 U/L (ref 1–32)
BILIRUB SERPL-MCNC: 0.3 MG/DL (ref 0–1.2)
BUN SERPL-MCNC: 11 MG/DL (ref 6–20)
BUN/CREAT SERPL: 16.9 (ref 7–25)
CALCIUM SPEC-SCNC: 9.2 MG/DL (ref 8.6–10.5)
CHLORIDE SERPL-SCNC: 105 MMOL/L (ref 98–107)
CHOLEST SERPL-MCNC: 234 MG/DL (ref 0–200)
CO2 SERPL-SCNC: 25.6 MMOL/L (ref 22–29)
CREAT SERPL-MCNC: 0.65 MG/DL (ref 0.57–1)
EGFRCR SERPLBLD CKD-EPI 2021: 107.4 ML/MIN/1.73
GLOBULIN UR ELPH-MCNC: 2.7 GM/DL
GLUCOSE SERPL-MCNC: 89 MG/DL (ref 65–99)
HCV AB SER DONR QL: NORMAL
HDLC SERPL-MCNC: 66 MG/DL (ref 40–60)
LDLC SERPL CALC-MCNC: 154 MG/DL (ref 0–100)
LDLC/HDLC SERPL: 2.29 {RATIO}
POTASSIUM SERPL-SCNC: 4.1 MMOL/L (ref 3.5–5.2)
PROT SERPL-MCNC: 7.1 G/DL (ref 6–8.5)
SODIUM SERPL-SCNC: 140 MMOL/L (ref 136–145)
TRIGL SERPL-MCNC: 83 MG/DL (ref 0–150)
VLDLC SERPL-MCNC: 14 MG/DL (ref 5–40)

## 2023-09-29 PROCEDURE — 86803 HEPATITIS C AB TEST: CPT

## 2023-09-29 PROCEDURE — 80061 LIPID PANEL: CPT

## 2023-09-29 PROCEDURE — 36415 COLL VENOUS BLD VENIPUNCTURE: CPT

## 2023-09-29 PROCEDURE — 80053 COMPREHEN METABOLIC PANEL: CPT

## 2023-10-10 PROBLEM — E66.09 CLASS 1 OBESITY DUE TO EXCESS CALORIES WITHOUT SERIOUS COMORBIDITY WITH BODY MASS INDEX (BMI) OF 34.0 TO 34.9 IN ADULT: Status: RESOLVED | Noted: 2023-09-18 | Resolved: 2023-10-10

## 2023-10-10 PROBLEM — E66.812 CLASS 2 SEVERE OBESITY DUE TO EXCESS CALORIES WITH SERIOUS COMORBIDITY AND BODY MASS INDEX (BMI) OF 35.0 TO 35.9 IN ADULT: Status: ACTIVE | Noted: 2023-10-10

## 2023-10-10 PROBLEM — E66.811 CLASS 1 OBESITY DUE TO EXCESS CALORIES WITHOUT SERIOUS COMORBIDITY WITH BODY MASS INDEX (BMI) OF 34.0 TO 34.9 IN ADULT: Status: RESOLVED | Noted: 2023-09-18 | Resolved: 2023-10-10

## 2023-10-10 PROBLEM — E66.01 CLASS 2 SEVERE OBESITY DUE TO EXCESS CALORIES WITH SERIOUS COMORBIDITY AND BODY MASS INDEX (BMI) OF 35.0 TO 35.9 IN ADULT: Status: ACTIVE | Noted: 2023-10-10

## 2023-10-11 NOTE — ASSESSMENT & PLAN NOTE
Patient's (Body mass index is 34.85 kg/mý.) indicates that they are obese (BMI >30) with health conditions that include none . Weight is worsening. BMI  is above average; BMI management plan is completed. We discussed portion control and increasing exercise.

## 2023-11-20 ENCOUNTER — TELEPHONE (OUTPATIENT)
Dept: FAMILY MEDICINE CLINIC | Age: 48
End: 2023-11-20

## 2023-11-20 NOTE — TELEPHONE ENCOUNTER
Caller: Destiney Thomas    Relationship: Self    Best call back number: [phone]     What is the medical concern/diagnosis: LEFT HAND THUMB IS LOCKING    What specialty or service is being requested: HAND SPECIALIST    What is the provider, practice or medical service name: N/A    What is the office location: N/A    What is the office phone number: N/A    Any additional details: PATIENT DOES NOT HAVE A PREFERENCE AS TO WHERE SHE IS REFERRED

## 2023-11-21 ENCOUNTER — OFFICE VISIT (OUTPATIENT)
Dept: FAMILY MEDICINE CLINIC | Age: 48
End: 2023-11-21
Payer: OTHER GOVERNMENT

## 2023-11-21 VITALS
TEMPERATURE: 97.6 F | OXYGEN SATURATION: 96 % | HEART RATE: 72 BPM | SYSTOLIC BLOOD PRESSURE: 125 MMHG | DIASTOLIC BLOOD PRESSURE: 61 MMHG | BODY MASS INDEX: 35.81 KG/M2 | WEIGHT: 222.8 LBS | HEIGHT: 66 IN

## 2023-11-21 DIAGNOSIS — M79.645 PAIN OF LEFT THUMB: Primary | ICD-10-CM

## 2023-11-21 PROCEDURE — 99213 OFFICE O/P EST LOW 20 MIN: CPT | Performed by: NURSE PRACTITIONER

## 2023-11-21 RX ORDER — METHYLPREDNISOLONE 4 MG/1
TABLET ORAL
Qty: 21 TABLET | Refills: 0 | Status: SHIPPED | OUTPATIENT
Start: 2023-11-21

## 2024-01-25 ENCOUNTER — HOSPITAL ENCOUNTER (OUTPATIENT)
Dept: MAMMOGRAPHY | Facility: HOSPITAL | Age: 49
Discharge: HOME | End: 2024-01-25
Admitting: NURSE PRACTITIONER
Payer: OTHER GOVERNMENT

## 2024-01-25 DIAGNOSIS — Z01.419 WELL WOMAN EXAM: ICD-10-CM

## 2024-01-25 PROCEDURE — 77067 SCR MAMMO BI INCL CAD: CPT

## 2024-01-25 PROCEDURE — 77063 BREAST TOMOSYNTHESIS BI: CPT

## 2024-04-26 ENCOUNTER — OFFICE VISIT (OUTPATIENT)
Dept: FAMILY MEDICINE CLINIC | Age: 49
End: 2024-04-26
Payer: OTHER GOVERNMENT

## 2024-04-26 ENCOUNTER — HOSPITAL ENCOUNTER (OUTPATIENT)
Dept: GENERAL RADIOLOGY | Facility: HOSPITAL | Age: 49
Discharge: HOME | End: 2024-04-26
Admitting: NURSE PRACTITIONER
Payer: OTHER GOVERNMENT

## 2024-04-26 VITALS
OXYGEN SATURATION: 100 % | TEMPERATURE: 98 F | WEIGHT: 230 LBS | SYSTOLIC BLOOD PRESSURE: 133 MMHG | HEIGHT: 66 IN | DIASTOLIC BLOOD PRESSURE: 67 MMHG | HEART RATE: 78 BPM | BODY MASS INDEX: 36.96 KG/M2

## 2024-04-26 DIAGNOSIS — M25.649 THUMB JOINT STIFFNESS: ICD-10-CM

## 2024-04-26 DIAGNOSIS — F33.0 MILD EPISODE OF RECURRENT MAJOR DEPRESSIVE DISORDER: ICD-10-CM

## 2024-04-26 DIAGNOSIS — Z13.6 ENCOUNTER FOR SCREENING FOR CARDIOVASCULAR DISORDERS: ICD-10-CM

## 2024-04-26 DIAGNOSIS — M79.671 RIGHT FOOT PAIN: Primary | ICD-10-CM

## 2024-04-26 DIAGNOSIS — F41.9 ANXIETY: ICD-10-CM

## 2024-04-26 DIAGNOSIS — M79.671 RIGHT FOOT PAIN: ICD-10-CM

## 2024-04-26 DIAGNOSIS — Z87.891 PERSONAL HISTORY OF TOBACCO USE, PRESENTING HAZARDS TO HEALTH: ICD-10-CM

## 2024-04-26 DIAGNOSIS — N95.1 HOT FLASHES DUE TO MENOPAUSE: ICD-10-CM

## 2024-04-26 PROBLEM — B35.1 NAIL FUNGUS: Status: RESOLVED | Noted: 2021-06-21 | Resolved: 2024-04-26

## 2024-04-26 PROBLEM — R29.898 DIFFICULTY BEARING WEIGHT ON RIGHT LOWER EXTREMITY: Status: RESOLVED | Noted: 2021-09-17 | Resolved: 2024-04-26

## 2024-04-26 PROBLEM — E66.812 CLASS 2 SEVERE OBESITY DUE TO EXCESS CALORIES WITH SERIOUS COMORBIDITY AND BODY MASS INDEX (BMI) OF 35.0 TO 35.9 IN ADULT: Status: RESOLVED | Noted: 2023-10-10 | Resolved: 2024-04-26

## 2024-04-26 PROBLEM — M62.838 MUSCLE SPASMS OF NECK: Status: RESOLVED | Noted: 2022-07-28 | Resolved: 2024-04-26

## 2024-04-26 PROBLEM — E66.01 CLASS 2 SEVERE OBESITY DUE TO EXCESS CALORIES WITH SERIOUS COMORBIDITY AND BODY MASS INDEX (BMI) OF 35.0 TO 35.9 IN ADULT: Status: RESOLVED | Noted: 2023-10-10 | Resolved: 2024-04-26

## 2024-04-26 PROCEDURE — 73630 X-RAY EXAM OF FOOT: CPT

## 2024-04-26 PROCEDURE — 99214 OFFICE O/P EST MOD 30 MIN: CPT | Performed by: NURSE PRACTITIONER

## 2024-04-26 RX ORDER — DICLOFENAC SODIUM 75 MG/1
75 TABLET, DELAYED RELEASE ORAL 2 TIMES DAILY PRN
Qty: 180 TABLET | Refills: 1 | Status: SHIPPED | OUTPATIENT
Start: 2024-04-26

## 2024-04-26 RX ORDER — VENLAFAXINE HYDROCHLORIDE 37.5 MG/1
37.5 CAPSULE, EXTENDED RELEASE ORAL DAILY
Qty: 90 CAPSULE | Refills: 1 | Status: SHIPPED | OUTPATIENT
Start: 2024-04-26

## 2024-04-26 RX ORDER — CYCLOBENZAPRINE HCL 10 MG
10 TABLET ORAL 3 TIMES DAILY PRN
COMMUNITY
End: 2024-04-26 | Stop reason: SDUPTHER

## 2024-04-26 RX ORDER — CYCLOBENZAPRINE HCL 10 MG
10 TABLET ORAL 3 TIMES DAILY PRN
Qty: 90 TABLET | Refills: 1 | Status: SHIPPED | OUTPATIENT
Start: 2024-04-26

## 2024-06-03 ENCOUNTER — TELEPHONE (OUTPATIENT)
Dept: FAMILY MEDICINE CLINIC | Age: 49
End: 2024-06-03
Payer: OTHER GOVERNMENT

## 2024-06-11 ENCOUNTER — LAB (OUTPATIENT)
Dept: LAB | Facility: HOSPITAL | Age: 49
End: 2024-06-11
Payer: OTHER GOVERNMENT

## 2024-06-11 DIAGNOSIS — Z13.6 ENCOUNTER FOR SCREENING FOR CARDIOVASCULAR DISORDERS: ICD-10-CM

## 2024-06-11 LAB
ALBUMIN SERPL-MCNC: 4.5 G/DL (ref 3.5–5.2)
ALBUMIN/GLOB SERPL: 1.6 G/DL
ALP SERPL-CCNC: 97 U/L (ref 39–117)
ALT SERPL W P-5'-P-CCNC: 28 U/L (ref 1–33)
ANION GAP SERPL CALCULATED.3IONS-SCNC: 8 MMOL/L (ref 5–15)
AST SERPL-CCNC: 25 U/L (ref 1–32)
BILIRUB SERPL-MCNC: 0.2 MG/DL (ref 0–1.2)
BUN SERPL-MCNC: 15 MG/DL (ref 6–20)
BUN/CREAT SERPL: 20.3 (ref 7–25)
CALCIUM SPEC-SCNC: 9.1 MG/DL (ref 8.6–10.5)
CHLORIDE SERPL-SCNC: 104 MMOL/L (ref 98–107)
CHOLEST SERPL-MCNC: 228 MG/DL (ref 0–200)
CO2 SERPL-SCNC: 26 MMOL/L (ref 22–29)
CREAT SERPL-MCNC: 0.74 MG/DL (ref 0.57–1)
EGFRCR SERPLBLD CKD-EPI 2021: 98.7 ML/MIN/1.73
GLOBULIN UR ELPH-MCNC: 2.8 GM/DL
GLUCOSE SERPL-MCNC: 93 MG/DL (ref 65–99)
HDLC SERPL-MCNC: 69 MG/DL (ref 40–60)
LDLC SERPL CALC-MCNC: 147 MG/DL (ref 0–100)
LDLC/HDLC SERPL: 2.1 {RATIO}
POTASSIUM SERPL-SCNC: 4.4 MMOL/L (ref 3.5–5.2)
PROT SERPL-MCNC: 7.3 G/DL (ref 6–8.5)
SODIUM SERPL-SCNC: 138 MMOL/L (ref 136–145)
TRIGL SERPL-MCNC: 71 MG/DL (ref 0–150)
VLDLC SERPL-MCNC: 12 MG/DL (ref 5–40)

## 2024-06-11 PROCEDURE — 80053 COMPREHEN METABOLIC PANEL: CPT

## 2024-06-11 PROCEDURE — 36415 COLL VENOUS BLD VENIPUNCTURE: CPT

## 2024-06-11 PROCEDURE — 80061 LIPID PANEL: CPT

## 2024-06-13 ENCOUNTER — OFFICE VISIT (OUTPATIENT)
Dept: FAMILY MEDICINE CLINIC | Age: 49
End: 2024-06-13
Payer: OTHER GOVERNMENT

## 2024-06-13 VITALS
HEART RATE: 78 BPM | WEIGHT: 229.4 LBS | DIASTOLIC BLOOD PRESSURE: 77 MMHG | BODY MASS INDEX: 36.87 KG/M2 | OXYGEN SATURATION: 95 % | SYSTOLIC BLOOD PRESSURE: 126 MMHG | TEMPERATURE: 97.8 F | HEIGHT: 66 IN

## 2024-06-13 DIAGNOSIS — Z00.00 ROUTINE GENERAL MEDICAL EXAMINATION AT A HEALTH CARE FACILITY: Primary | ICD-10-CM

## 2024-06-13 DIAGNOSIS — F33.0 MILD EPISODE OF RECURRENT MAJOR DEPRESSIVE DISORDER: ICD-10-CM

## 2024-06-13 DIAGNOSIS — Z87.891 PERSONAL HISTORY OF TOBACCO USE, PRESENTING HAZARDS TO HEALTH: ICD-10-CM

## 2024-06-13 DIAGNOSIS — Z12.12 ENCOUNTER FOR COLORECTAL CANCER SCREENING: ICD-10-CM

## 2024-06-13 DIAGNOSIS — Z12.11 ENCOUNTER FOR COLORECTAL CANCER SCREENING: ICD-10-CM

## 2024-06-13 DIAGNOSIS — E78.2 MIXED HYPERLIPIDEMIA: ICD-10-CM

## 2024-06-13 DIAGNOSIS — G43.019 INTRACTABLE MIGRAINE WITHOUT AURA AND WITHOUT STATUS MIGRAINOSUS: ICD-10-CM

## 2024-06-13 DIAGNOSIS — J30.2 SEASONAL ALLERGIES: ICD-10-CM

## 2024-06-13 DIAGNOSIS — N95.1 HOT FLASHES DUE TO MENOPAUSE: ICD-10-CM

## 2024-06-13 PROCEDURE — 99214 OFFICE O/P EST MOD 30 MIN: CPT | Performed by: NURSE PRACTITIONER

## 2024-06-13 RX ORDER — VENLAFAXINE HYDROCHLORIDE 75 MG/1
75 CAPSULE, EXTENDED RELEASE ORAL DAILY
Qty: 90 CAPSULE | Refills: 0 | Status: SHIPPED | OUTPATIENT
Start: 2024-06-13

## 2024-06-13 RX ORDER — FLUTICASONE PROPIONATE 50 MCG
2 SPRAY, SUSPENSION (ML) NASAL DAILY
Qty: 18 G | Refills: 5 | Status: SHIPPED | OUTPATIENT
Start: 2024-06-13

## 2024-06-13 NOTE — PROGRESS NOTES
"Chief Complaint  Annual Exam    Subjective          Destiney Thomas presents to [facility] for annual exam.  Patient states she is doing well.  She agrees to lung cancer screening.  Declines smoking cessation.  She smokes a pack a day and has for the last 33 years.  Denies shortness of air.  She does have albuterol inhaler at home if needed.    Maxalt is helpful with migraines.  They do not occur very often.    We had started her on Effexor 37.5 mg daily for depression and hot flashes/night sweats.  She states there has been some improvement but not resolved yet.  She is tolerating medication well with no adverse reactions.    Review of Systems   Constitutional:  Negative for fatigue.   HENT:  Negative for hearing loss and trouble swallowing.    Respiratory:  Negative for cough and shortness of breath.    Cardiovascular:  Negative for chest pain, palpitations and leg swelling.   Gastrointestinal:  Negative for abdominal pain, constipation, diarrhea, nausea and vomiting.   Genitourinary:  Negative for difficulty urinating.   Musculoskeletal:  Negative for arthralgias and myalgias.   Skin:  Negative for rash.   Neurological:  Negative for headaches.   Psychiatric/Behavioral:  Negative for dysphoric mood, sleep disturbance and suicidal ideas. The patient is not nervous/anxious.         Objective   Vital Signs:   Vitals:    06/13/24 1258   BP: 126/77   BP Location: Right arm   Patient Position: Sitting   Pulse: 78   Temp: 97.8 °F (36.6 °C)   TempSrc: Oral   SpO2: 95%   Weight: 104 kg (229 lb 6.4 oz)   Height: 167.6 cm (65.98\")       Body mass index is 37.04 kg/m².        Physical Exam  Vitals reviewed.   Constitutional:       General: She is not in acute distress.     Appearance: Normal appearance. She is well-developed. She is not diaphoretic.   HENT:      Head: Normocephalic and atraumatic. Hair is normal.      Right Ear: Hearing and external ear normal. No decreased hearing noted. No " drainage.      Left Ear: Hearing and external ear normal. No decreased hearing noted.      Nose: Nose normal. No nasal deformity.      Mouth/Throat:      Mouth: Mucous membranes are moist.   Eyes:      General: Lids are normal.         Right eye: No discharge.         Left eye: No discharge.      Extraocular Movements: Extraocular movements intact.      Conjunctiva/sclera: Conjunctivae normal.      Pupils: Pupils are equal, round, and reactive to light.   Neck:      Thyroid: No thyromegaly.   Cardiovascular:      Rate and Rhythm: Normal rate and regular rhythm.      Pulses: Normal pulses.      Heart sounds: Normal heart sounds. No murmur heard.     No friction rub. No gallop.   Pulmonary:      Effort: Pulmonary effort is normal. No respiratory distress.      Breath sounds: Normal breath sounds. No wheezing or rales.   Chest:      Chest wall: No tenderness.   Abdominal:      General: Bowel sounds are normal. There is no distension.      Palpations: Abdomen is soft. There is no mass.      Tenderness: There is no abdominal tenderness. There is no guarding or rebound.      Hernia: No hernia is present.   Musculoskeletal:         General: No tenderness or deformity. Normal range of motion.      Cervical back: Normal range of motion and neck supple.   Lymphadenopathy:      Cervical: No cervical adenopathy.   Skin:     General: Skin is warm and dry.      Findings: No erythema or rash.   Neurological:      Mental Status: She is alert and oriented to person, place, and time.      Motor: No abnormal muscle tone.      Coordination: Coordination normal.   Psychiatric:         Mood and Affect: Mood normal.         Behavior: Behavior normal.         Thought Content: Thought content normal.         Judgment: Judgment normal.          Lab Results   Component Value Date    GLUCOSE 93 06/11/2024    BUN 15 06/11/2024    CREATININE 0.74 06/11/2024    EGFR 98.7 06/11/2024    BCR 20.3 06/11/2024    K 4.4 06/11/2024    CO2 26.0 06/11/2024  "   CALCIUM 9.1 06/11/2024    ALBUMIN 4.5 06/11/2024    BILITOT 0.2 06/11/2024    AST 25 06/11/2024    ALT 28 06/11/2024     No results found for: \"HGBA1C\"  Lab Results   Component Value Date    WBC 6.96 08/14/2020    HGB 14.20 08/14/2020    HCT 42.0 08/14/2020    MCV 99.8 (H) 08/14/2020    .00 08/14/2020     Lab Results   Component Value Date    CHOL 228 (H) 06/11/2024    CHOL 234 (H) 09/29/2023    CHOL 226 (H) 03/30/2023    CHLPL 191 08/14/2020     Lab Results   Component Value Date    TRIG 71 06/11/2024    TRIG 83 09/29/2023    TRIG 62 03/30/2023     Lab Results   Component Value Date    HDL 69 (H) 06/11/2024    HDL 66 (H) 09/29/2023    HDL 74 (H) 03/30/2023     Lab Results   Component Value Date     (H) 06/11/2024     (H) 09/29/2023     (H) 03/30/2023     Lab Results   Component Value Date    TSH 0.825 08/14/2020                  Assessment and Plan    Assessment & Plan  Routine general medical examination at a health care facility  Counseled on routine dental and eye exams.  Declines all vaccines.  Due for lung cancer screen, colonoscopy, .  Up-to-date on blood work.     Mixed hyperlipidemia    will notify patient of results and treat accordingly.  Heart healthy diet  Seasonal allergies  Continue Claritin and Flonase.  Intractable migraine without aura and without status migrainosus      Continue Maxalt as needed.      Personal history of tobacco use, presenting hazards to health  Patient strongly encouraged to consider tobacco cessation.  Potential health complications discussed.  Patient is aware.  Patient is not interested in cessation at this time.  Encouraged patient to reach out to the office when they are ready to quit.  Encounter for colorectal cancer screening  Referral initiated.  Hot flashes due to menopause  Increasing Effexor from 37.5 mg to 75 mg daily.  Mild episode of recurrent major depressive disorder   increasing Effexor from 37.5 mg to 75 mg daily.  Potential side " effects of increased discussed.    Orders Placed This Encounter   Procedures   • CT Chest Low Dose Wo   • Comprehensive Metabolic Panel   • Lipid Panel   • Ambulatory Referral For Screening Colonoscopy   The 10-year ASCVD risk score (Han FLETCHER, et al., 2019) is: 3.1%    Values used to calculate the score:      Age: 48 years      Sex: Female      Is Non- : No      Diabetic: No      Tobacco smoker: Yes      Systolic Blood Pressure: 126 mmHg      Is BP treated: No      HDL Cholesterol: 69 mg/dL      Total Cholesterol: 228 mg/dL    New Medications Ordered This Visit   Medications   • fluticasone (FLONASE) 50 MCG/ACT nasal spray     Si sprays by Each Nare route Daily.     Dispense:  18 g     Refill:  5   • venlafaxine XR (Effexor XR) 75 MG 24 hr capsule     Sig: Take 1 capsule by mouth Daily.     Dispense:  90 capsule     Refill:  0         Patient to notify office with any acute concerns or issues.  Patient verbalizes understanding, agrees with plan of care and has no further questions upon discharge.    Please note that portions of this note were completed with a voice recognition program.      Follow Up    Return in about 6 weeks (around 2024) for Recheck.  Patient was given instructions and counseling regarding her condition or for health maintenance advice. Please see specific information pulled into the AVS if appropriate.   Medications Discontinued During This Encounter   Medication Reason   • fluticasone (FLONASE) 50 MCG/ACT nasal spray Reorder   • venlafaxine XR (Effexor XR) 37.5 MG 24 hr capsule Reorder

## 2024-06-21 DIAGNOSIS — M25.649 THUMB JOINT STIFFNESS: ICD-10-CM

## 2024-06-21 RX ORDER — DICLOFENAC SODIUM 75 MG/1
75 TABLET, DELAYED RELEASE ORAL 2 TIMES DAILY PRN
Qty: 180 TABLET | Refills: 1 | OUTPATIENT
Start: 2024-06-21

## 2024-07-25 ENCOUNTER — OFFICE VISIT (OUTPATIENT)
Dept: FAMILY MEDICINE CLINIC | Age: 49
End: 2024-07-25
Payer: OTHER GOVERNMENT

## 2024-07-25 VITALS
WEIGHT: 231 LBS | SYSTOLIC BLOOD PRESSURE: 124 MMHG | DIASTOLIC BLOOD PRESSURE: 81 MMHG | OXYGEN SATURATION: 96 % | HEART RATE: 69 BPM | BODY MASS INDEX: 37.12 KG/M2 | HEIGHT: 66 IN | TEMPERATURE: 98 F

## 2024-07-25 DIAGNOSIS — F33.0 MILD EPISODE OF RECURRENT MAJOR DEPRESSIVE DISORDER: Primary | ICD-10-CM

## 2024-07-25 PROCEDURE — 99213 OFFICE O/P EST LOW 20 MIN: CPT | Performed by: NURSE PRACTITIONER

## 2024-07-25 RX ORDER — VENLAFAXINE HYDROCHLORIDE 75 MG/1
75 CAPSULE, EXTENDED RELEASE ORAL DAILY
Qty: 90 CAPSULE | Refills: 1 | Status: SHIPPED | OUTPATIENT
Start: 2024-07-25

## 2024-07-25 RX ORDER — VENLAFAXINE HYDROCHLORIDE 150 MG/1
150 CAPSULE, EXTENDED RELEASE ORAL DAILY
Qty: 90 CAPSULE | Refills: 1 | Status: SHIPPED | OUTPATIENT
Start: 2024-07-25

## 2024-07-25 NOTE — PROGRESS NOTES
"Chief Complaint  Depression    Subjective          Destineyrupal Phan presents to [facility] for depressoin f/u. Her effexor had increased from 75mg to 150mg daily approximately 2 weeks ago. Father passed away on the 16th. Pt knows that she can call Hospice for grief counseling. Feels as if she has good support at home. Is tolerating increase but is very upset.     PHQ-9 Depression Screening  Little interest or pleasure in doing things? 2-->more than half the days   Feeling down, depressed, or hopeless? 3-->nearly every day   Trouble falling or staying asleep, or sleeping too much? 3-->nearly every day   Feeling tired or having little energy? 3-->nearly every day   Poor appetite or overeating? 3-->nearly every day   Feeling bad about yourself - or that you are a failure or have let yourself or your family down? 3-->nearly every day   Trouble concentrating on things, such as reading the newspaper or watching television? 3-->nearly every day   Moving or speaking so slowly that other people could have noticed? Or the opposite - being so fidgety or restless that you have been moving around a lot more than usual? 0-->not at all   Thoughts that you would be better off dead, or of hurting yourself in some way? 0-->not at all   PHQ-9 Total Score 20   If you checked off any problems, how difficult have these problems made it for you to do your work, take care of things at home, or get along with other people? very difficult         Rush phan.   Objective   Vital Signs:   Vitals:    07/25/24 1246   BP: 124/81   BP Location: Right arm   Patient Position: Sitting   Cuff Size: Large Adult   Pulse: 69   Temp: 98 °F (36.7 °C)   TempSrc: Oral   SpO2: 96%   Weight: 105 kg (231 lb)   Height: 167.6 cm (65.98\")       Body mass index is 37.3 kg/m².    Physical Exam  Constitutional:       General: She is not in acute distress.     Appearance: Normal appearance. She is not ill-appearing.   Pulmonary: "      Effort: Pulmonary effort is normal. No respiratory distress.   Neurological:      Mental Status: She is alert and oriented to person, place, and time.   Psychiatric:         Behavior: Behavior normal.         Thought Content: Thought content normal.         Judgment: Judgment normal.      Comments: tearful                     Assessment and Plan    Assessment & Plan  Mild episode of recurrent major depressive disorder   Increasing effexor from 150 to 225mg daily. Encouraged pt to reach out to hospice for grief counseling.      New Medications Ordered This Visit   Medications    venlafaxine XR (Effexor XR) 150 MG 24 hr capsule     Sig: Take 1 capsule by mouth Daily. Take along with 75mg tab to equal 225mg daily.     Dispense:  90 capsule     Refill:  1    venlafaxine XR (Effexor XR) 75 MG 24 hr capsule     Sig: Take 1 capsule by mouth Daily. Take along with 150mg tab to equal 225mg daily.     Dispense:  90 capsule     Refill:  1         Patient to notify office with any acute concerns or issues.  Patient verbalizes understanding, agrees with plan of care and has no further questions upon discharge.    Please note that portions of this note were completed with a voice recognition program.      Follow Up    Return in 6 weeks (on 9/5/2024).  Patient was given instructions and counseling regarding her condition or for health maintenance advice. Please see specific information pulled into the AVS if appropriate.     Medications Discontinued During This Encounter   Medication Reason    venlafaxine XR (Effexor XR) 150 MG 24 hr capsule Reorder

## 2024-07-26 ENCOUNTER — TELEPHONE (OUTPATIENT)
Dept: FAMILY MEDICINE CLINIC | Age: 49
End: 2024-07-26
Payer: OTHER GOVERNMENT

## 2024-08-05 ENCOUNTER — HOSPITAL ENCOUNTER (OUTPATIENT)
Dept: CT IMAGING | Facility: HOSPITAL | Age: 49
Discharge: HOME | End: 2024-08-05
Admitting: NURSE PRACTITIONER
Payer: OTHER GOVERNMENT

## 2024-08-05 DIAGNOSIS — Z87.891 PERSONAL HISTORY OF TOBACCO USE, PRESENTING HAZARDS TO HEALTH: ICD-10-CM

## 2024-08-05 PROCEDURE — 71271 CT THORAX LUNG CANCER SCR C-: CPT

## 2024-09-05 ENCOUNTER — OFFICE VISIT (OUTPATIENT)
Dept: FAMILY MEDICINE CLINIC | Age: 49
End: 2024-09-05
Payer: OTHER GOVERNMENT

## 2024-09-05 VITALS
SYSTOLIC BLOOD PRESSURE: 122 MMHG | OXYGEN SATURATION: 95 % | TEMPERATURE: 98.1 F | HEIGHT: 66 IN | HEART RATE: 69 BPM | WEIGHT: 233.4 LBS | DIASTOLIC BLOOD PRESSURE: 77 MMHG | BODY MASS INDEX: 37.51 KG/M2

## 2024-09-05 DIAGNOSIS — F41.9 ANXIETY: ICD-10-CM

## 2024-09-05 DIAGNOSIS — F33.0 MILD EPISODE OF RECURRENT MAJOR DEPRESSIVE DISORDER: Primary | ICD-10-CM

## 2024-09-05 PROCEDURE — 99213 OFFICE O/P EST LOW 20 MIN: CPT | Performed by: NURSE PRACTITIONER

## 2024-09-05 RX ORDER — VENLAFAXINE HYDROCHLORIDE 150 MG/1
300 CAPSULE, EXTENDED RELEASE ORAL DAILY
Qty: 180 CAPSULE | Refills: 1 | Status: SHIPPED | OUTPATIENT
Start: 2024-09-05

## 2024-09-05 RX ORDER — HYDROXYZINE HYDROCHLORIDE 25 MG/1
TABLET, FILM COATED ORAL
Qty: 60 TABLET | Refills: 1 | Status: SHIPPED | OUTPATIENT
Start: 2024-09-05

## 2024-09-05 NOTE — PROGRESS NOTES
"Chief Complaint  Depression    Subjective          Destiney Thomas presents to [facility] for 6 week depression f/u. Her effexor had been increased from 150mg to 225mg daily. Pt did not call hospice for counseling with for her father. She states that it did help somewhat, but she is still struggling. She has applied to several jobs and feels as if that will help.       Objective   Vital Signs:   Vitals:    09/05/24 1248   BP: 122/77   BP Location: Right arm   Patient Position: Sitting   Cuff Size: Large Adult   Pulse: 69   Temp: 98.1 °F (36.7 °C)   TempSrc: Oral   SpO2: 95%   Weight: 106 kg (233 lb 6.4 oz)   Height: 167.6 cm (65.98\")       Body mass index is 37.69 kg/m².    Physical Exam  Constitutional:       General: She is not in acute distress.     Appearance: Normal appearance. She is not ill-appearing.   Pulmonary:      Effort: Pulmonary effort is normal. No respiratory distress.   Neurological:      Mental Status: She is alert and oriented to person, place, and time.   Psychiatric:         Mood and Affect: Mood normal.         Behavior: Behavior normal.         Thought Content: Thought content normal.         Judgment: Judgment normal.      Comments: tearful                     Assessment and Plan    Assessment & Plan  Mild episode of recurrent major depressive disorder   Increase from 225mg to 300mg daily. Strongly encouraged pt to call hospice. Decline counseling referral. Discussed grief process and how getting a job may be helpful.   Anxiety  Hydroxyzine as needed. Potential side effects of medication discussed.        New Medications Ordered This Visit   Medications    venlafaxine XR (Effexor XR) 150 MG 24 hr capsule     Sig: Take 2 capsules by mouth Daily.     Dispense:  180 capsule     Refill:  1     D/c 225mg dose.    hydrOXYzine (ATARAX) 25 MG tablet     Sig: Take 1/2 to 1 tablet po q6hrs during the day prn anxiety and 1 to 2 tablets every night prn anxiety/insomnia "     Dispense:  60 tablet     Refill:  1         Patient to notify office with any acute concerns or issues.  Patient verbalizes understanding, agrees with plan of care and has no further questions upon discharge.    Please note that portions of this note were completed with a voice recognition program.      Follow Up    Return in 6 weeks (on 10/17/2024) for Recheck.  Patient was given instructions and counseling regarding her condition or for health maintenance advice. Please see specific information pulled into the AVS if appropriate.     Medications Discontinued During This Encounter   Medication Reason    venlafaxine XR (Effexor XR) 75 MG 24 hr capsule Dose adjustment    venlafaxine XR (Effexor XR) 150 MG 24 hr capsule Reorder

## 2024-09-13 RX ORDER — VENLAFAXINE HYDROCHLORIDE 75 MG/1
75 CAPSULE, EXTENDED RELEASE ORAL DAILY
Qty: 90 CAPSULE | Refills: 0 | OUTPATIENT
Start: 2024-09-13

## 2024-11-04 RX ORDER — VENLAFAXINE HYDROCHLORIDE 37.5 MG/1
37.5 CAPSULE, EXTENDED RELEASE ORAL DAILY
Qty: 90 CAPSULE | Refills: 1 | OUTPATIENT
Start: 2024-11-04

## 2025-02-09 DIAGNOSIS — M25.649 THUMB JOINT STIFFNESS: ICD-10-CM

## 2025-02-10 RX ORDER — DICLOFENAC SODIUM 75 MG/1
75 TABLET, DELAYED RELEASE ORAL 2 TIMES DAILY PRN
Qty: 180 TABLET | Refills: 1 | Status: SHIPPED | OUTPATIENT
Start: 2025-02-10

## 2025-03-11 RX ORDER — VENLAFAXINE HYDROCHLORIDE 150 MG/1
300 CAPSULE, EXTENDED RELEASE ORAL DAILY
Qty: 180 CAPSULE | Refills: 1 | Status: SHIPPED | OUTPATIENT
Start: 2025-03-11

## 2025-06-13 ENCOUNTER — TELEPHONE (OUTPATIENT)
Dept: FAMILY MEDICINE CLINIC | Age: 50
End: 2025-06-13
Payer: OTHER GOVERNMENT

## (undated) DEVICE — NDL HYPO ECLPS SFTY 22G 1 1/2IN

## (undated) DEVICE — GAUZE,SPONGE,4"X4",16PLY,STRL,LF,10/TRAY: Brand: MEDLINE

## (undated) DEVICE — BNDG ELAS DELUXE REINF 10CM 5MTR STRL

## (undated) DEVICE — INTENDED FOR TISSUE SEPARATION, AND OTHER PROCEDURES THAT REQUIRE A SHARP SURGICAL BLADE TO PUNCTURE OR CUT.: Brand: BARD-PARKER ® CARBON RIB-BACK BLADES

## (undated) DEVICE — NON-ADHERENT PADS PREPACK: Brand: TELFA

## (undated) DEVICE — GAMMEX® NON-LATEX SIZE 7.5, STERILE NEOPRENE POWDER-FREE SURGICAL GLOVE: Brand: GAMMEX

## (undated) DEVICE — BANDAGE,GAUZE,BULKEE II,4.5"X4.1YD,STRL: Brand: MEDLINE

## (undated) DEVICE — VAGINAL PREP TRAY: Brand: MEDLINE INDUSTRIES, INC.

## (undated) DEVICE — EXTREMITY-LF: Brand: MEDLINE INDUSTRIES, INC.

## (undated) DEVICE — SUT VIC 2/0 SH 27IN

## (undated) DEVICE — GLV SURG BIOGEL LTX PF 7 1/2

## (undated) DEVICE — STERILE POLYISOPRENE POWDER-FREE SURGICAL GLOVES WITH EMOLLIENT COATING: Brand: PROTEXIS

## (undated) DEVICE — SUT ETHLN 3-0 FS118IN 663H